# Patient Record
Sex: FEMALE | Race: BLACK OR AFRICAN AMERICAN | NOT HISPANIC OR LATINO | Employment: OTHER | URBAN - METROPOLITAN AREA
[De-identification: names, ages, dates, MRNs, and addresses within clinical notes are randomized per-mention and may not be internally consistent; named-entity substitution may affect disease eponyms.]

---

## 2017-07-11 ENCOUNTER — OFFICE VISIT (OUTPATIENT)
Dept: OBSTETRICS AND GYNECOLOGY | Facility: CLINIC | Age: 67
End: 2017-07-11
Payer: MEDICARE

## 2017-07-11 VITALS
HEIGHT: 66 IN | BODY MASS INDEX: 31.2 KG/M2 | SYSTOLIC BLOOD PRESSURE: 132 MMHG | WEIGHT: 194.13 LBS | DIASTOLIC BLOOD PRESSURE: 72 MMHG

## 2017-07-11 DIAGNOSIS — Z11.3 SCREENING FOR GONORRHEA: ICD-10-CM

## 2017-07-11 DIAGNOSIS — N76.0 BACTERIAL VAGINOSIS: ICD-10-CM

## 2017-07-11 DIAGNOSIS — B96.89 BACTERIAL VAGINOSIS: ICD-10-CM

## 2017-07-11 DIAGNOSIS — A56.2 CHLAMYDIAL INFECTION OF GENITOURINARY TRACT: ICD-10-CM

## 2017-07-11 DIAGNOSIS — Z01.419 ENCOUNTER FOR GYNECOLOGICAL EXAMINATION (GENERAL) (ROUTINE) WITHOUT ABNORMAL FINDINGS: Primary | ICD-10-CM

## 2017-07-11 PROCEDURE — G0101 CA SCREEN;PELVIC/BREAST EXAM: HCPCS | Mod: S$PBB,,, | Performed by: OBSTETRICS & GYNECOLOGY

## 2017-07-11 PROCEDURE — 87591 N.GONORRHOEAE DNA AMP PROB: CPT

## 2017-07-11 PROCEDURE — 87660 TRICHOMONAS VAGIN DIR PROBE: CPT

## 2017-07-11 PROCEDURE — 99999 PR PBB SHADOW E&M-NEW PATIENT-LVL III: CPT | Mod: PBBFAC,,, | Performed by: OBSTETRICS & GYNECOLOGY

## 2017-07-11 PROCEDURE — 99203 OFFICE O/P NEW LOW 30 MIN: CPT | Mod: PBBFAC,PN,59 | Performed by: OBSTETRICS & GYNECOLOGY

## 2017-07-11 PROCEDURE — 87480 CANDIDA DNA DIR PROBE: CPT

## 2017-07-11 PROCEDURE — G0101 CA SCREEN;PELVIC/BREAST EXAM: HCPCS | Mod: PBBFAC,PN | Performed by: OBSTETRICS & GYNECOLOGY

## 2017-07-11 RX ORDER — OXYCODONE HYDROCHLORIDE 5 MG/1
5 CAPSULE ORAL EVERY 4 HOURS PRN
Status: ON HOLD | COMMUNITY
End: 2018-04-25 | Stop reason: HOSPADM

## 2017-07-11 RX ORDER — LORATADINE 10 MG/1
10 TABLET ORAL DAILY
COMMUNITY

## 2017-07-11 RX ORDER — METFORMIN HYDROCHLORIDE 500 MG/1
500 TABLET ORAL
COMMUNITY

## 2017-07-11 RX ORDER — CYCLOBENZAPRINE HCL 10 MG
10 TABLET ORAL 3 TIMES DAILY PRN
COMMUNITY
End: 2018-07-26

## 2017-07-11 RX ORDER — ATENOLOL 25 MG/1
25 TABLET ORAL DAILY
COMMUNITY
End: 2018-01-15 | Stop reason: ALTCHOICE

## 2017-07-11 RX ORDER — CELECOXIB 200 MG/1
200 CAPSULE ORAL 2 TIMES DAILY
COMMUNITY
End: 2018-01-15 | Stop reason: ALTCHOICE

## 2017-07-11 RX ORDER — VALSARTAN AND HYDROCHLOROTHIAZIDE 160; 12.5 MG/1; MG/1
1 TABLET, FILM COATED ORAL DAILY
COMMUNITY
End: 2018-01-02 | Stop reason: DRUGHIGH

## 2017-07-11 RX ORDER — ROSUVASTATIN CALCIUM 10 MG/1
10 TABLET, COATED ORAL NIGHTLY
COMMUNITY

## 2017-07-11 RX ORDER — OMEPRAZOLE 40 MG/1
40 CAPSULE, DELAYED RELEASE ORAL DAILY
COMMUNITY

## 2017-07-11 NOTE — PROGRESS NOTES
Subjective:       Patient ID: Aimee Montoya is a 66 y.o. female.    Chief Complaint:  Well Woman      History of Present Illness  HPI  Annual Exam-Postmenopausal  Patient presents for annual exam. The patient has no complaints today. The patient is sexually active--not using condoms but with usual partner. GYN screening history: last pap: was normal and patient does not recall when last pap was and last mammogram: approximate date 2017 and was normal--still followed by dr yoon. The patient has never been taking hormone replacement therapy. Patient denies post-menopausal vaginal bleeding. The patient wears seatbelts: yes. The patient participates in regular exercise: yes.--cycles 15min/d 3-4 times a week;  Has the patient ever been transfused or tattooed?: no. The patient reports that there is not domestic violence in her life.      Reports intermittent pelvic pains; sharp-shooting    Reports problems with constipation        GYN & OB HistoryNo LMP recorded. Patient is postmenopausal.   Date of Last Pap: No result found    OB History    Para Term  AB Living   1 1       1   SAB TAB Ectopic Multiple Live Births                  # Outcome Date GA Lbr Milo/2nd Weight Sex Delivery Anes PTL Lv   1 Para                   Review of Systems  Review of Systems   Constitutional: Negative for activity change, appetite change, chills, diaphoresis, fatigue, fever and unexpected weight change.   HENT: Negative for mouth sores and tinnitus.    Eyes: Negative for discharge and visual disturbance.   Respiratory: Negative for cough, shortness of breath and wheezing.    Cardiovascular: Negative for chest pain, palpitations and leg swelling.   Gastrointestinal: Negative for abdominal pain, bloating, blood in stool, constipation, diarrhea, nausea and vomiting.   Endocrine: Negative for diabetes, hair loss, hot flashes, hyperthyroidism and hypothyroidism.   Genitourinary: Positive for vaginal discharge. Negative for  decreased libido, dyspareunia, dysuria, flank pain, frequency, genital sores, hematuria, menorrhagia, menstrual problem, pelvic pain, urgency, vaginal bleeding, vaginal pain, dysmenorrhea, urinary incontinence, postcoital bleeding, postmenopausal bleeding and vaginal odor.   Musculoskeletal: Negative for back pain and myalgias.   Skin:  Negative for rash, no acne and hair changes.   Neurological: Negative for seizures, syncope, numbness and headaches.   Hematological: Negative for adenopathy. Does not bruise/bleed easily.   Psychiatric/Behavioral: Negative for depression and sleep disturbance. The patient is not nervous/anxious.    Breast: Negative for breast mass, breast pain, nipple discharge and skin changes          Objective:    Physical Exam:   Constitutional: She appears well-developed.     Eyes: Conjunctivae and EOM are normal. Pupils are equal, round, and reactive to light.    Neck: Normal range of motion. Neck supple.     Pulmonary/Chest: Effort normal. Right breast exhibits no mass, no nipple discharge, no skin change and no tenderness. Left breast exhibits no mass, no nipple discharge, no skin change and no tenderness. Breasts are symmetrical.        Abdominal: Soft.     Genitourinary: Rectum normal and uterus normal. Pelvic exam was performed with patient supine. Cervix is normal. Right adnexum displays no mass and no tenderness. Left adnexum displays no mass and no tenderness. No erythema, bleeding, rectocele, cystocele or unspecified prolapse of vaginal walls in the vagina. Vaginal discharge found. Labial bartholins normal.       Uterus Size: 6 cm   Musculoskeletal: Normal range of motion.       Neurological: She is alert.    Skin: Skin is warm.    Psychiatric: She has a normal mood and affect.          Assessment:        1. Encounter for gynecological examination (general) (routine) without abnormal findings    2. Screening for gonorrhea    3. Bacterial vaginosis    4. Chlamydial infection of  genitourinary tract                Plan:      Continue annual well woman exam.  Continue diet, exercise, weight loss  Safe sex  Gc/ct/affirm today  Increase water and fiber for constipation

## 2017-07-12 LAB
C TRACH DNA SPEC QL NAA+PROBE: NOT DETECTED
CANDIDA RRNA VAG QL PROBE: NEGATIVE
G VAGINALIS RRNA GENITAL QL PROBE: NEGATIVE
N GONORRHOEA DNA SPEC QL NAA+PROBE: NOT DETECTED
T VAGINALIS RRNA GENITAL QL PROBE: NEGATIVE

## 2017-09-06 ENCOUNTER — TELEPHONE (OUTPATIENT)
Dept: OBSTETRICS AND GYNECOLOGY | Facility: CLINIC | Age: 67
End: 2017-09-06

## 2017-09-06 NOTE — TELEPHONE ENCOUNTER
Spoke to the pt. and informed her that her test were negative. junior.[email or web address]    Pt at [phone]//states she is calling to get the results of test she had done//please call//thanks/lalo

## 2017-12-28 ENCOUNTER — TELEPHONE (OUTPATIENT)
Dept: OBSTETRICS AND GYNECOLOGY | Facility: CLINIC | Age: 67
End: 2017-12-28

## 2017-12-28 NOTE — TELEPHONE ENCOUNTER
----- Message from Mariella Collado sent at 12/28/2017  7:23 AM CST -----  Contact: self  Pt stated that she has fibroids and needs to be seen today.  She started bleeding last night.      Pt can be reached at [phone]

## 2017-12-28 NOTE — TELEPHONE ENCOUNTER
----- Message from Bhumi Retana sent at 12/28/2017  1:55 PM CST -----  Contact: self  Patient states went to ER for vaginal bleeding   Pt states advised to follow up in one to two weeks.   Please call pt [phone]

## 2017-12-28 NOTE — TELEPHONE ENCOUNTER
Pt states she started bleeding last night and she's concern because she has fibroids, but the bleeding has stopped since then she would like to be seen by you today.  I informed her that you're out of the office until Tuesday.  She voiced understanding and asked when could you see her?  Please advise

## 2017-12-28 NOTE — TELEPHONE ENCOUNTER
I spoke with pt she states she went to the ED for bleeding today.  I informed her that Dr. Esposito said for her to come into the office next week for an appointment.  Appointment scheduled on 01/02/18 at 8am [place] location.  She verbalized understanding.

## 2018-01-02 ENCOUNTER — OFFICE VISIT (OUTPATIENT)
Dept: OBSTETRICS AND GYNECOLOGY | Facility: CLINIC | Age: 68
End: 2018-01-02
Payer: MEDICARE

## 2018-01-02 VITALS
DIASTOLIC BLOOD PRESSURE: 78 MMHG | WEIGHT: 194 LBS | HEIGHT: 66 IN | SYSTOLIC BLOOD PRESSURE: 136 MMHG | BODY MASS INDEX: 31.18 KG/M2

## 2018-01-02 DIAGNOSIS — N95.0 POST-MENOPAUSAL BLEEDING: Primary | ICD-10-CM

## 2018-01-02 PROCEDURE — 99999 PR PBB SHADOW E&M-EST. PATIENT-LVL II: CPT | Mod: PBBFAC,,, | Performed by: OBSTETRICS & GYNECOLOGY

## 2018-01-02 PROCEDURE — 88305 TISSUE EXAM BY PATHOLOGIST: CPT | Mod: 26,,, | Performed by: PATHOLOGY

## 2018-01-02 PROCEDURE — 99213 OFFICE O/P EST LOW 20 MIN: CPT | Mod: 25,S$PBB,, | Performed by: OBSTETRICS & GYNECOLOGY

## 2018-01-02 PROCEDURE — 99212 OFFICE O/P EST SF 10 MIN: CPT | Mod: PBBFAC,PN,25 | Performed by: OBSTETRICS & GYNECOLOGY

## 2018-01-02 PROCEDURE — 58100 BIOPSY OF UTERUS LINING: CPT | Mod: S$PBB,,, | Performed by: OBSTETRICS & GYNECOLOGY

## 2018-01-02 PROCEDURE — 88305 TISSUE EXAM BY PATHOLOGIST: CPT | Performed by: PATHOLOGY

## 2018-01-02 PROCEDURE — 58100 BIOPSY OF UTERUS LINING: CPT | Mod: PBBFAC,PN | Performed by: OBSTETRICS & GYNECOLOGY

## 2018-01-02 RX ORDER — VALSARTAN AND HYDROCHLOROTHIAZIDE 320; 12.5 MG/1; MG/1
TABLET, FILM COATED ORAL
COMMUNITY
Start: 2017-12-27 | End: 2018-07-26

## 2018-01-02 RX ORDER — METOPROLOL SUCCINATE 25 MG/1
25 TABLET, EXTENDED RELEASE ORAL NIGHTLY
COMMUNITY
Start: 2017-12-02

## 2018-01-02 NOTE — PROGRESS NOTES
Subjective:       Patient ID: Aimee Montoya is a 67 y.o. female.    Chief Complaint:  Vaginal Bleeding      History of Present Illness  HPI  Postmenopausal Bleeding  Patient complains of vaginal bleeding. She has been menopausal for  years. Currently on no HRT . Bleeding described as onto panti liner for 1 day; seen in the er for the same; denies uterine cramping; last intercourse; 1 month ago; no condom use;  Seen in ER for same; had cbc and ua done; gc/ct pending; wet prep negative    Patient recalls similar history in the past but cannot recall the date-- , maybe; last pap  and wnl    2015--ut 7x4.5x 5.3  Menstrual History:  OB History      Para Term  AB Living    1 1       1    SAB TAB Ectopic Multiple Live Births                      Menarche age:   No LMP recorded. Patient is postmenopausal. since age 48       GYN & OB History  No LMP recorded. Patient is postmenopausal.   Date of Last Pap: No result found    OB History    Para Term  AB Living   1 1       1   SAB TAB Ectopic Multiple Live Births                  # Outcome Date GA Lbr Milo/2nd Weight Sex Delivery Anes PTL Lv   1 Para                   Review of Systems  Review of Systems   Constitutional: Negative for activity change, appetite change, chills, diaphoresis, fatigue, fever and unexpected weight change.   HENT: Negative for mouth sores and tinnitus.    Eyes: Negative for discharge and visual disturbance.   Respiratory: Negative for cough, shortness of breath and wheezing.    Cardiovascular: Negative for chest pain, palpitations and leg swelling.   Gastrointestinal: Negative for abdominal pain, bloating, blood in stool, constipation, diarrhea, nausea and vomiting.   Endocrine: Negative for diabetes, hair loss, hot flashes, hyperthyroidism and hypothyroidism.   Genitourinary: Positive for postmenopausal bleeding. Negative for decreased libido, dyspareunia, dysuria, flank pain, frequency, genital sores, hematuria,  menorrhagia, menstrual problem, pelvic pain, urgency, vaginal bleeding, vaginal discharge, vaginal pain, dysmenorrhea, urinary incontinence, postcoital bleeding and vaginal odor.   Musculoskeletal: Negative for back pain and myalgias.   Skin:  Negative for rash, no acne and hair changes.   Neurological: Negative for seizures, syncope, numbness and headaches.   Hematological: Negative for adenopathy. Does not bruise/bleed easily.   Psychiatric/Behavioral: Negative for depression and sleep disturbance. The patient is not nervous/anxious.    Breast: Negative for breast mass, breast pain, nipple discharge and skin changes          Objective:    Physical Exam:   Constitutional: She appears well-developed.     Eyes: Conjunctivae and EOM are normal. Pupils are equal, round, and reactive to light.    Neck: Normal range of motion. Neck supple.     Pulmonary/Chest: Effort normal. Right breast exhibits no mass, no nipple discharge, no skin change, no tenderness and presence. Left breast exhibits no mass, no nipple discharge, no skin change, no tenderness and presence. Breasts are symmetrical.        Abdominal: Soft.     Genitourinary: Vagina normal and uterus normal. Pelvic exam was performed with patient supine.           Musculoskeletal: Normal range of motion.       Neurological: She is alert.    Skin: Skin is warm.    Psychiatric: She has a normal mood and affect.          Assessment:     Encounter Diagnosis   Name Primary?    Post-menopausal bleeding Yes                 Plan:      Reviewed need for emb today  Verbal consent obtained  Pelvic sono ordered  Pt feels she may want hysterectomy as post menopausal bleeding continues to happen intermittently  Per pt report--current on pap

## 2018-01-02 NOTE — PROCEDURES
Procedures   CC: ENDOMETRIAL BIOPSPY    Aimee Montoya is a 67 y.o. female  presents for an endometrial biopsy secondary to   Encounter Diagnosis   Name Primary?    Post-menopausal bleeding Yes     .  UPT was not done.      PRE-ENDOMETRIAL BIOPSY COUNSELING:    The patient was informed of the risk of bleeding, infection, uterine perforation and pain and that the test will rule-out endometrial cancer with accuracy greater than 95%.  She was counseled on the alternatives to endometrial biopsy and agrees to proceed.      TIME OUT PERFORMED.    The cervix was visualized with a speculum.  A single tooth tenaculum was placed on the anterior lip prior to the biopsy.      A sterile endometrial pipelle was passed without difficulty to a depth of 9 cm.    Endometrial tissue was obtained.      The specimen was placed in formalyn and sent to Pathology of histology evaluation.    The patient tolerated the procedure well.      ASSESSMENT AND PLAN  1. Post-menopausal bleeding  US OB/GYN Procedure ([facility])       POST ENDOMETRIAL BIOPSY COUNSELING:  Manage post biopsy cramping with NSAIDs or Tylenol.  Expect spotting or light bleeding for a few days.  Report bleeding heavier than a period, fever > 101.0 F, worsening pain or a foul smelling vaginal discharge.      Counseling lasted approximately 15 minutes and all her questions were answered.      FOLLOW-UP:  Pending biopsy

## 2018-01-04 ENCOUNTER — PROCEDURE VISIT (OUTPATIENT)
Dept: OBSTETRICS AND GYNECOLOGY | Facility: CLINIC | Age: 68
End: 2018-01-04
Payer: MEDICARE

## 2018-01-04 DIAGNOSIS — N95.0 POST-MENOPAUSAL BLEEDING: ICD-10-CM

## 2018-01-04 PROCEDURE — 76856 US EXAM PELVIC COMPLETE: CPT | Mod: PBBFAC,PN | Performed by: OBSTETRICS & GYNECOLOGY

## 2018-01-04 PROCEDURE — 76856 US EXAM PELVIC COMPLETE: CPT | Mod: 26,S$PBB,, | Performed by: OBSTETRICS & GYNECOLOGY

## 2018-01-09 ENCOUNTER — TELEPHONE (OUTPATIENT)
Dept: OBSTETRICS AND GYNECOLOGY | Facility: CLINIC | Age: 68
End: 2018-01-09

## 2018-01-09 NOTE — TELEPHONE ENCOUNTER
----- Message from Carie Garcia sent at 1/9/2018  1:37 PM CST -----  Contact: self [phone]  Would like to consult with nurse regarding lab results.   Please call back at [phone].  Md Nichole

## 2018-01-09 NOTE — TELEPHONE ENCOUNTER
Unable to leave a message because their is no voicemail set=up. Ssmith,lpn    Please keep scheduled appt to review ultrasound results

## 2018-01-15 ENCOUNTER — TELEPHONE (OUTPATIENT)
Dept: OBSTETRICS AND GYNECOLOGY | Facility: CLINIC | Age: 68
End: 2018-01-15

## 2018-01-15 ENCOUNTER — OFFICE VISIT (OUTPATIENT)
Dept: OBSTETRICS AND GYNECOLOGY | Facility: CLINIC | Age: 68
End: 2018-01-15
Payer: MEDICARE

## 2018-01-15 VITALS
WEIGHT: 197.19 LBS | SYSTOLIC BLOOD PRESSURE: 162 MMHG | HEIGHT: 66 IN | BODY MASS INDEX: 31.69 KG/M2 | DIASTOLIC BLOOD PRESSURE: 89 MMHG

## 2018-01-15 DIAGNOSIS — D25.1 INTRAMURAL LEIOMYOMA OF UTERUS: ICD-10-CM

## 2018-01-15 DIAGNOSIS — N95.0 POST-MENOPAUSAL BLEEDING: Primary | ICD-10-CM

## 2018-01-15 PROCEDURE — 99213 OFFICE O/P EST LOW 20 MIN: CPT | Mod: S$PBB,,, | Performed by: OBSTETRICS & GYNECOLOGY

## 2018-01-15 PROCEDURE — 99213 OFFICE O/P EST LOW 20 MIN: CPT | Mod: PBBFAC,PN | Performed by: OBSTETRICS & GYNECOLOGY

## 2018-01-15 PROCEDURE — 99999 PR PBB SHADOW E&M-EST. PATIENT-LVL III: CPT | Mod: PBBFAC,,, | Performed by: OBSTETRICS & GYNECOLOGY

## 2018-01-15 NOTE — PROGRESS NOTES
Subjective:       Patient ID: Aimee Montoya is a 67 y.o. female.    Chief Complaint:  Follow-up      History of Present Illness  HPI  Here for f/u  Reports no longer having an episode of post menopausal bleeding--tired of it happening  emb--scant atrophic tissue, no hyperplasia  Ut --Uterus: Visualized  Endometrium: distorted by presence of fibroids  Uterus long 6.9 cm  Uterus ap 4.3 cm  Uterus tr 5.2 cm  Uterus vol 80.2 cmÂ³  Endometrial thickness, total 4.1 mm  Fibroids: Fibroids identified  Findings: Intramural  D1 42.1 mm  D2 28.6 mm  D3 33.4 mm  Mean 34.7 mm  Vol 21.057 cmÂ³    Right Ovary  =========  Rt ovary: Suboptimal    Left Ovary  ========  Lt ovary: Visualized  Lt ovary D1 2.6 cm  Lt ovary D2 1.7 cm  Lt ovary D3 1.5 cm  Lt ovary mean 1.9 cm  Lt ovary vol 3.4 cmÂ³    Impression  =========  4.2 x 3.9 x 3.3cm intramural fibroid seen and endometrial stripe somewhat obscured by the fibroid  Right ovary suboptimal  Left ovary and adnexa appear normal .                                                        GYN & OB History  No LMP recorded. Patient is postmenopausal.   Date of Last Pap: No result found    OB History    Para Term  AB Living   1 1       1   SAB TAB Ectopic Multiple Live Births                  # Outcome Date GA Lbr Milo/2nd Weight Sex Delivery Anes PTL Lv   1 Para                   Review of Systems  Review of Systems   Constitutional: Negative for activity change, appetite change, chills, diaphoresis, fatigue, fever and unexpected weight change.   HENT: Negative for mouth sores and tinnitus.    Eyes: Negative for discharge and visual disturbance.   Respiratory: Negative for cough, shortness of breath and wheezing.    Cardiovascular: Negative for chest pain, palpitations and leg swelling.   Gastrointestinal: Negative for abdominal pain, bloating, blood in stool, constipation, diarrhea, nausea and vomiting.   Endocrine: Negative for diabetes, hair loss, hot flashes, hyperthyroidism  and hypothyroidism.   Genitourinary: Positive for postmenopausal bleeding. Negative for decreased libido, dyspareunia, dysuria, flank pain, frequency, genital sores, hematuria, menorrhagia, menstrual problem, pelvic pain, urgency, vaginal bleeding, vaginal discharge, vaginal pain, dysmenorrhea, urinary incontinence, postcoital bleeding and vaginal odor.   Musculoskeletal: Negative for back pain and myalgias.   Skin:  Negative for rash, no acne and hair changes.   Neurological: Negative for seizures, syncope, numbness and headaches.   Hematological: Negative for adenopathy. Does not bruise/bleed easily.   Psychiatric/Behavioral: Negative for depression and sleep disturbance. The patient is not nervous/anxious.    Breast: Negative for breast mass, breast pain, nipple discharge and skin changes          Objective:    Physical Exam:   Constitutional: She appears well-developed.     Eyes: Conjunctivae and EOM are normal. Pupils are equal, round, and reactive to light.    Neck: Normal range of motion. Neck supple.     Pulmonary/Chest: Effort normal.        Abdominal: Soft.             Musculoskeletal: Normal range of motion.       Neurological: She is alert.    Skin: Skin is warm.    Psychiatric: She has a normal mood and affect.          Assessment:     Encounter Diagnoses   Name Primary?    Post-menopausal bleeding Yes    Intramural leiomyoma of uterus              Plan:      Reviewed options  Reassurance given  In light of normal emb and benign fibroids  Options reviewed--continued observation, hysteroscopy with d&c possible myosure, hysterectomy  Patient wishes to proceed with hysterectomy; case request submitted  Pt advised to maintain good glycemic control prior to surgery

## 2018-01-29 ENCOUNTER — TELEPHONE (OUTPATIENT)
Dept: OBSTETRICS AND GYNECOLOGY | Facility: CLINIC | Age: 68
End: 2018-01-29

## 2018-01-29 NOTE — TELEPHONE ENCOUNTER
States she's coming in for her pre op appts and states that she needs to resched due to her daughter having jury duty. Pt surgery is scheduled for 02/07/18 and can be reached at [phone]//thanks/dbw

## 2018-02-28 ENCOUNTER — OFFICE VISIT (OUTPATIENT)
Dept: OBSTETRICS AND GYNECOLOGY | Facility: CLINIC | Age: 68
End: 2018-02-28
Payer: MEDICARE

## 2018-02-28 ENCOUNTER — HOSPITAL ENCOUNTER (OUTPATIENT)
Dept: PREADMISSION TESTING | Facility: HOSPITAL | Age: 68
Discharge: HOME | End: 2018-02-28
Attending: OBSTETRICS & GYNECOLOGY
Payer: MEDICARE

## 2018-02-28 ENCOUNTER — CLINICAL SUPPORT (OUTPATIENT)
Dept: CARDIOLOGY | Facility: CLINIC | Age: 68
End: 2018-02-28
Payer: MEDICARE

## 2018-02-28 VITALS
SYSTOLIC BLOOD PRESSURE: 140 MMHG | HEIGHT: 66 IN | WEIGHT: 180 LBS | WEIGHT: 194.88 LBS | DIASTOLIC BLOOD PRESSURE: 76 MMHG | BODY MASS INDEX: 28.25 KG/M2 | BODY MASS INDEX: 31.32 KG/M2 | HEIGHT: 67 IN

## 2018-02-28 DIAGNOSIS — Z01.818 PRE-OP TESTING: Primary | ICD-10-CM

## 2018-02-28 DIAGNOSIS — Z01.818 PRE-OP TESTING: ICD-10-CM

## 2018-02-28 DIAGNOSIS — N95.0 POST-MENOPAUSAL BLEEDING: Primary | ICD-10-CM

## 2018-02-28 PROCEDURE — 99999 PR PBB SHADOW E&M-EST. PATIENT-LVL II: CPT | Mod: PBBFAC,,, | Performed by: OBSTETRICS & GYNECOLOGY

## 2018-02-28 PROCEDURE — 99212 OFFICE O/P EST SF 10 MIN: CPT | Mod: PBBFAC | Performed by: OBSTETRICS & GYNECOLOGY

## 2018-02-28 PROCEDURE — 93010 ELECTROCARDIOGRAM REPORT: CPT | Mod: S$PBB,,, | Performed by: INTERNAL MEDICINE

## 2018-02-28 PROCEDURE — 99499 UNLISTED E&M SERVICE: CPT | Mod: S$PBB,,, | Performed by: OBSTETRICS & GYNECOLOGY

## 2018-02-28 PROCEDURE — 93005 ELECTROCARDIOGRAM TRACING: CPT | Mod: PBBFAC | Performed by: INTERNAL MEDICINE

## 2018-02-28 NOTE — PROGRESS NOTES
History & Physical    SUBJECTIVE:     History of Present Illness:  Patient is a 67 y.o. female presents with intermittent post menopausal bleeding despite negtive emb; . Onset of symptoms was several years ago; has had 2 negative emb; history of fibroid uterus; denies use of hrt; Uterus: Visualized  Endometrium: distorted by presence of fibroids  Uterus long 6.9 cm  Uterus ap 4.3 cm  Uterus tr 5.2 cm  Uterus vol 80.2 cmÂ³  Endometrial thickness, total 4.1 mm  Fibroids: Fibroids identified  Findings: Intramural  D1 42.1 mm  D2 28.6 mm  D3 33.4 mm  Mean 34.7 mm  Vol 21.057 cmÂ³    Right Ovary  =========  Rt ovary: Suboptimal    Left Ovary  ========  Lt ovary: Visualized  Lt ovary D1 2.6 cm  Lt ovary D2 1.7 cm  Lt ovary D3 1.5 cm  Lt ovary mean 1.9 cm  Lt ovary vol 3.4 cmÂ³    Impression  =========  4.2 x 3.9 x 3.3cm intramural fibroid seen and endometrial stripe somewhat obscured by the fibroid  Right ovary suboptimal  Left ovary and adnexa appear normal .                                                          Chief Complaint   Patient presents with    Pre-op Exam       Review of patient's allergies indicates:   Allergen Reactions    Dye Rash    Pcn [penicillins] Rash       Current Outpatient Prescriptions   Medication Sig Dispense Refill    metformin (GLUCOPHAGE) 500 MG tablet Take 500 mg by mouth daily with breakfast.       metoprolol succinate (TOPROL-XL) 25 MG 24 hr tablet Take 25 mg by mouth every evening.       omeprazole (PRILOSEC) 40 MG capsule Take 40 mg by mouth once daily.      oxycodone (OXY-IR) 5 mg Cap Take 5 mg by mouth every 4 (four) hours as needed for Pain.      PATADAY 0.2 % Drop       rosuvastatin (CRESTOR) 10 MG tablet Take 10 mg by mouth every evening.       valsartan-hydrochlorothiazide (DIOVAN-HCT) 320-12.5 mg per tablet       cyclobenzaprine (FLEXERIL) 10 MG tablet Take 10 mg by mouth 3 (three) times daily as needed for Muscle spasms.      loratadine (CLARITIN) 10 mg tablet Take  "10 mg by mouth once daily.       No current facility-administered medications for this visit.        Past Medical History:   Diagnosis Date    Diabetes mellitus     High cholesterol     Hypertension     Seasonal allergies      Past Surgical History:   Procedure Laterality Date    BREAST LUMPECTOMY Left      SECTION      x 1    CHOLECYSTECTOMY       Family History   Problem Relation Age of Onset    Cancer Father     Heart disease Mother      Social History   Substance Use Topics    Smoking status: Never Smoker    Smokeless tobacco: Never Used    Alcohol use No        Review of Systems:  Review of Systems   Constitutional: Negative.    HENT: Negative.    Eyes: Negative.    Cardiovascular: Negative.    Gastrointestinal: Negative.    Endocrine: Negative.    Genitourinary: Positive for vaginal bleeding.   Musculoskeletal: Negative.    Skin: Negative.    Allergic/Immunologic: Negative.    Neurological: Negative.    Hematological: Negative.    Psychiatric/Behavioral: Negative.  Negative for agitation.       OBJECTIVE:     Vital Signs (Most Recent)  BP: (!) 140/76 (18 1328)  5' 6" (1.676 m)  88.4 kg (194 lb 14.2 oz)     Physical Exam:  Physical Exam   Constitutional: She is oriented to person, place, and time. She appears well-developed.   HENT:   Head: Normocephalic.   Eyes: Conjunctivae and EOM are normal. Pupils are equal, round, and reactive to light.   Neck: Normal range of motion.   Cardiovascular: Normal rate.    Pulmonary/Chest: Effort normal and breath sounds normal.   Abdominal: Soft.   Musculoskeletal: Normal range of motion.   Neurological: She is alert and oriented to person, place, and time.   Skin: Skin is warm and dry.   Psychiatric: She has a normal mood and affect. Her behavior is normal. Judgment and thought content normal.   Vitals reviewed.      Laboratory  cbc,bhcg, cmp, ekg pending    Diagnostic Results:  US: Reviewed  see findings above    ASSESSMENT/PLAN:     Encounter " Diagnosis   Name Primary?    Post-menopausal bleeding Yes         PLAN:Plan     Reviewed robotic assisted laparoscopic hysterectomy with bilateral salpingoophorectomy procedure in detail.  Reviewed risks including but not limited to infection, bleeding, damage to bowel/bladder, cva;htn, death.  Pt aware hysterectomy will render her unable to have children.     All questions answered to the best of my ability.  Alternatives reviewed --continued observation; hysteroscopy with myosure/d&c.  Consents signed and witnessed.  Patient aware of surgery date and time.  Stressed compliance with perfect diabetes control post op to help with post op healing.  Post op appt made.

## 2018-02-28 NOTE — DISCHARGE INSTRUCTIONS
To confirm, Your doctor has instructed you that surgery is scheduled for 3/7/18 at  07:00 am.       Please report to [facility], [address], 1st floor, main lobby by 05:30 am  Pre admit office will call afternoon prior to surgery with final arrival time    INSTRUCTIONS IMPORTANT!!!   Do not eat, drink, or smoke after 12 midnight. May have water and clear liquid juice until 3 hrs prior to surgery.   OK to brush teeth, no gum, candy or mints!    ¨ Take only these medicines with a small swallow of water-morning of surgery.  Prilosec, Pataday eye drops    Hold Diovan morning of Surgery    Hold Metformin 3/6 and morning of Surgery    Pre operative instructions:  Please review the Pre-Operative Instruction booklet that you were given.        Bathing Instructions--See page 6 in the Pre-operative booklet.      Prevention of surgical site infections:     -Keep incisions clean and dry.   -Do not soak/submerge incisions in water until completely healed.   -Do not apply lotions, powders, creams, or deodorants to site.   -Always make sure hands are cleaned with antibacterial soap/ alcohol-based                 prior to touching the surgical site.  (This includes doctors,                 nurses, staff, and yourself.)    Signs and symptoms:   -Redness and pain around the area where you had surgery   -Drainage of cloudy fluid from your surgical wound   -Fever over 100.4       I have read or had read and explained to me, and understand the above information.  Additional comments or instructions:  Received a copy of Pre-operative instructions booklet, FAQ surgical site infection sheet, and packets of hibiclens (if indicated).

## 2018-03-05 ENCOUNTER — TELEPHONE (OUTPATIENT)
Dept: OBSTETRICS AND GYNECOLOGY | Facility: CLINIC | Age: 68
End: 2018-03-05

## 2018-03-05 DIAGNOSIS — Z01.818 PREOP EXAMINATION: ICD-10-CM

## 2018-03-05 DIAGNOSIS — R94.31 ABNORMAL EKG: Primary | ICD-10-CM

## 2018-03-05 NOTE — TELEPHONE ENCOUNTER
Faxed pt.'s abn cardiology report to Dr. Leo and informed the pt. That she needs a clearance before she can have her sx on 3/7/18.  Pt. Acknowledged understanding. susie Bone

## 2018-03-13 ENCOUNTER — TELEPHONE (OUTPATIENT)
Dept: OBSTETRICS AND GYNECOLOGY | Facility: CLINIC | Age: 68
End: 2018-03-13

## 2018-03-13 DIAGNOSIS — N95.0 POST-MENOPAUSAL BLEEDING: Primary | ICD-10-CM

## 2018-03-13 DIAGNOSIS — D25.1 INTRAMURAL LEIOMYOMA OF UTERUS: ICD-10-CM

## 2018-04-24 ENCOUNTER — ANESTHESIA EVENT (OUTPATIENT)
Dept: SURGERY | Facility: HOSPITAL | Age: 68
End: 2018-04-24
Payer: MEDICARE

## 2018-04-24 ENCOUNTER — LAB VISIT (OUTPATIENT)
Dept: LAB | Facility: HOSPITAL | Age: 68
End: 2018-04-24
Attending: OBSTETRICS & GYNECOLOGY
Payer: MEDICARE

## 2018-04-24 ENCOUNTER — TELEPHONE (OUTPATIENT)
Dept: OBSTETRICS AND GYNECOLOGY | Facility: CLINIC | Age: 68
End: 2018-04-24

## 2018-04-24 DIAGNOSIS — N95.0 POST-MENOPAUSAL BLEEDING: ICD-10-CM

## 2018-04-24 DIAGNOSIS — N95.0 POST-MENOPAUSAL BLEEDING: Primary | ICD-10-CM

## 2018-04-24 LAB
ALBUMIN SERPL BCP-MCNC: 4.1 G/DL
ALP SERPL-CCNC: 44 U/L
ALT SERPL W/O P-5'-P-CCNC: 24 U/L
ANION GAP SERPL CALC-SCNC: 11 MMOL/L
AST SERPL-CCNC: 31 U/L
BASOPHILS # BLD AUTO: 0.01 K/UL
BASOPHILS NFR BLD: 0.2 %
BILIRUB SERPL-MCNC: 0.8 MG/DL
BUN SERPL-MCNC: 13 MG/DL
CALCIUM SERPL-MCNC: 10.2 MG/DL
CHLORIDE SERPL-SCNC: 104 MMOL/L
CO2 SERPL-SCNC: 27 MMOL/L
CREAT SERPL-MCNC: 0.8 MG/DL
DIFFERENTIAL METHOD: ABNORMAL
EOSINOPHIL # BLD AUTO: 0.1 K/UL
EOSINOPHIL NFR BLD: 1.2 %
ERYTHROCYTE [DISTWIDTH] IN BLOOD BY AUTOMATED COUNT: 13.8 %
EST. GFR  (AFRICAN AMERICAN): >60 ML/MIN/1.73 M^2
EST. GFR  (NON AFRICAN AMERICAN): >60 ML/MIN/1.73 M^2
GLUCOSE SERPL-MCNC: 97 MG/DL
HCT VFR BLD AUTO: 40.4 %
HGB BLD-MCNC: 13.1 G/DL
IMM GRANULOCYTES # BLD AUTO: 0 K/UL
IMM GRANULOCYTES NFR BLD AUTO: 0 %
LYMPHOCYTES # BLD AUTO: 2.1 K/UL
LYMPHOCYTES NFR BLD: 52.2 %
MCH RBC QN AUTO: 29.9 PG
MCHC RBC AUTO-ENTMCNC: 32.4 G/DL
MCV RBC AUTO: 92 FL
MONOCYTES # BLD AUTO: 0.4 K/UL
MONOCYTES NFR BLD: 10 %
NEUTROPHILS # BLD AUTO: 1.5 K/UL
NEUTROPHILS NFR BLD: 36.4 %
NRBC BLD-RTO: 0 /100 WBC
PLATELET # BLD AUTO: 166 K/UL
PMV BLD AUTO: 10.9 FL
POTASSIUM SERPL-SCNC: 3.5 MMOL/L
PROT SERPL-MCNC: 8.2 G/DL
RBC # BLD AUTO: 4.38 M/UL
SODIUM SERPL-SCNC: 142 MMOL/L
WBC # BLD AUTO: 4.08 K/UL

## 2018-04-24 PROCEDURE — 36415 COLL VENOUS BLD VENIPUNCTURE: CPT | Mod: PO

## 2018-04-24 PROCEDURE — 85025 COMPLETE CBC W/AUTO DIFF WBC: CPT

## 2018-04-24 PROCEDURE — 80053 COMPREHEN METABOLIC PANEL: CPT

## 2018-04-24 NOTE — TELEPHONE ENCOUNTER
----- Message from Funmi Valdes sent at 4/24/2018  8:59 AM CDT -----  Contact: pt  She's calling in regards to surgery 4/25 she stated she has a few questions pls call pt back at [phone] (home)

## 2018-04-24 NOTE — PRE ADMISSION SCREENING
Pre op instructions reviewed with patient per phone:    To confirm, Your surgeon has instructed you:  Surgery is scheduled 04/25/18at 0800.      Please report to [facility] 1st floor main lobby by 0630.   Pre admit office to call later today only if arrival time changes.      INSTRUCTIONS IMPORTANT!!!  ¨ Do not eat, drink, or smoke after 12 midnight-including water. OK to brush teeth, no gum, candy or mints!    ¨ Take only these medicines with a small swallow of water-morning of surgery.  Prilosec        ____  Do not wear makeup, including mascara.  ____  No powder, lotions or creams to surgical area.  ____  Please remove all jewelry, including piercings and leave at home.  ____  No money or valuables needed. Please leave at home.  ____  Please bring identification and insurance information to hospital.  ____  If going home the same day, arrange for a ride home. You will not be able to   drive if Anesthesia was used.  ____  Children, under 12 years old, must remain in the waiting room with an adult.  They are not allowed in patient areas.  ____  Wear loose fitting clothing. Allow for dressings, bandages.  ____  Stop Aspirin, Ibuprofen, Motrin and Aleve at least 5-7 days before surgery, unless otherwise instructed by your doctor, or the nurse.   You MAY use Tylenol/acetaminophen until day of surgery.  ____  If you take diabetic medication, do not take am of surgery unless instructed by   Doctor.  ____ Stop taking any Fish Oil supplement or any Vitamins that contain Vitamin E at least 5 days prior to surgery.          Bathing Instructions-- The night before surgery and the morning prior to coming to the hospital:   -Do not shave the surgical area.   -Shower and wash your hair and body as usual with anti-bacterial  soap and shampoo.   -Rinse your hair and body completely.   -Use one packet of hibiclens to wash the surgical site (using your hand) gently for 5 minutes.  Do not scrub you skin too  hard.   -Do not use hibiclens on your head, face, or genitals.   -Do not wash with anti-bacterial soap after you use the hibiclens.   -Rinse your body thoroughly.   -Dry with clean, soft towel.  Do not use lotion, cream, deodorant, or powders on   the surgical site.    Use antibacterial soap in place of hibiclens if your surgery is on the head, face or genitals.         Surgical Site Infection    Prevention of surgical site infections:     -Keep incisions clean and dry.   -Do not soak/submerge incisions in water until completely healed.   -Do not apply lotions, powders, creams, or deodorants to site.   -Always make sure hands are cleaned with antibacterial soap/ alcohol-based   prior to touching the surgical site.  (This includes doctors, nurses, staff, and yourself.)    Signs and symptoms:   -Redness and pain around the area where you had surgery   -Drainage of cloudy fluid from your surgical wound   -Fever over 100.4  I have read or had read and explained to me, and understand the above information.

## 2018-04-24 NOTE — TELEPHONE ENCOUNTER
Spoke to pt she was calling to confirm her time for surgery she voiced understanding per [place] to be there for 530a

## 2018-04-25 ENCOUNTER — ANESTHESIA (OUTPATIENT)
Dept: SURGERY | Facility: HOSPITAL | Age: 68
End: 2018-04-25
Payer: MEDICARE

## 2018-04-25 ENCOUNTER — HOSPITAL ENCOUNTER (OUTPATIENT)
Facility: HOSPITAL | Age: 68
Discharge: HOME | End: 2018-04-25
Attending: OBSTETRICS & GYNECOLOGY | Admitting: OBSTETRICS & GYNECOLOGY
Payer: MEDICARE

## 2018-04-25 ENCOUNTER — SURGERY (OUTPATIENT)
Age: 68
End: 2018-04-25

## 2018-04-25 ENCOUNTER — TELEPHONE (OUTPATIENT)
Dept: OBSTETRICS AND GYNECOLOGY | Facility: CLINIC | Age: 68
End: 2018-04-25

## 2018-04-25 DIAGNOSIS — N95.0 POST-MENOPAUSAL BLEEDING: ICD-10-CM

## 2018-04-25 DIAGNOSIS — Z90.710 S/P LAPAROSCOPIC HYSTERECTOMY: Primary | ICD-10-CM

## 2018-04-25 LAB
POCT GLUCOSE: 125 MG/DL (ref 70–110)
POCT GLUCOSE: 160 MG/DL (ref 70–110)

## 2018-04-25 PROCEDURE — 63600175 PHARM REV CODE 636 W HCPCS: Performed by: OBSTETRICS & GYNECOLOGY

## 2018-04-25 PROCEDURE — 71000039 HC RECOVERY, EACH ADD'L HOUR: Performed by: OBSTETRICS & GYNECOLOGY

## 2018-04-25 PROCEDURE — 63600175 PHARM REV CODE 636 W HCPCS: Performed by: NURSE ANESTHETIST, CERTIFIED REGISTERED

## 2018-04-25 PROCEDURE — 36000712 HC OR TIME LEV V 1ST 15 MIN: Performed by: OBSTETRICS & GYNECOLOGY

## 2018-04-25 PROCEDURE — 37000008 HC ANESTHESIA 1ST 15 MINUTES: Performed by: OBSTETRICS & GYNECOLOGY

## 2018-04-25 PROCEDURE — 36000713 HC OR TIME LEV V EA ADD 15 MIN: Performed by: OBSTETRICS & GYNECOLOGY

## 2018-04-25 PROCEDURE — 27201423 OPTIME MED/SURG SUP & DEVICES STERILE SUPPLY: Performed by: OBSTETRICS & GYNECOLOGY

## 2018-04-25 PROCEDURE — 58660 LAPAROSCOPY LYSIS: CPT | Mod: ,,, | Performed by: SURGERY

## 2018-04-25 PROCEDURE — S0077 INJECTION, CLINDAMYCIN PHOSP: HCPCS | Performed by: OBSTETRICS & GYNECOLOGY

## 2018-04-25 PROCEDURE — 88307 TISSUE EXAM BY PATHOLOGIST: CPT | Mod: 26,,, | Performed by: PATHOLOGY

## 2018-04-25 PROCEDURE — 58571 TLH W/T/O 250 G OR LESS: CPT | Mod: ,,, | Performed by: OBSTETRICS & GYNECOLOGY

## 2018-04-25 PROCEDURE — 37000009 HC ANESTHESIA EA ADD 15 MINS: Performed by: OBSTETRICS & GYNECOLOGY

## 2018-04-25 PROCEDURE — 71000016 HC POSTOP RECOV ADDL HR: Performed by: OBSTETRICS & GYNECOLOGY

## 2018-04-25 PROCEDURE — 63600175 PHARM REV CODE 636 W HCPCS: Performed by: ANESTHESIOLOGY

## 2018-04-25 PROCEDURE — 71000033 HC RECOVERY, INTIAL HOUR: Performed by: OBSTETRICS & GYNECOLOGY

## 2018-04-25 PROCEDURE — 71000015 HC POSTOP RECOV 1ST HR: Performed by: OBSTETRICS & GYNECOLOGY

## 2018-04-25 PROCEDURE — 25000003 PHARM REV CODE 250: Performed by: OBSTETRICS & GYNECOLOGY

## 2018-04-25 PROCEDURE — C2628 CATHETER, OCCLUSION: HCPCS | Performed by: OBSTETRICS & GYNECOLOGY

## 2018-04-25 PROCEDURE — 25000003 PHARM REV CODE 250: Performed by: NURSE ANESTHETIST, CERTIFIED REGISTERED

## 2018-04-25 PROCEDURE — 88307 TISSUE EXAM BY PATHOLOGIST: CPT | Performed by: PATHOLOGY

## 2018-04-25 RX ORDER — CLINDAMYCIN PHOSPHATE 900 MG/50ML
900 INJECTION, SOLUTION INTRAVENOUS
Status: COMPLETED | OUTPATIENT
Start: 2018-04-25 | End: 2018-04-25

## 2018-04-25 RX ORDER — DIPHENHYDRAMINE HYDROCHLORIDE 50 MG/ML
25 INJECTION INTRAMUSCULAR; INTRAVENOUS EVERY 4 HOURS PRN
Status: DISCONTINUED | OUTPATIENT
Start: 2018-04-25 | End: 2018-04-25 | Stop reason: HOSPADM

## 2018-04-25 RX ORDER — HYDROCODONE BITARTRATE AND ACETAMINOPHEN 5; 325 MG/1; MG/1
1 TABLET ORAL EVERY 4 HOURS PRN
Qty: 20 TABLET | Refills: 0 | Status: SHIPPED | OUTPATIENT
Start: 2018-04-25 | End: 2018-05-24

## 2018-04-25 RX ORDER — DIPHENHYDRAMINE HYDROCHLORIDE 50 MG/ML
25 INJECTION INTRAMUSCULAR; INTRAVENOUS EVERY 6 HOURS PRN
Status: DISCONTINUED | OUTPATIENT
Start: 2018-04-25 | End: 2018-04-25 | Stop reason: SDUPTHER

## 2018-04-25 RX ORDER — HYDROCODONE BITARTRATE AND ACETAMINOPHEN 5; 325 MG/1; MG/1
1 TABLET ORAL
Status: DISCONTINUED | OUTPATIENT
Start: 2018-04-25 | End: 2018-04-25 | Stop reason: HOSPADM

## 2018-04-25 RX ORDER — NEOSTIGMINE METHYLSULFATE 1 MG/ML
INJECTION, SOLUTION INTRAVENOUS
Status: DISCONTINUED | OUTPATIENT
Start: 2018-04-25 | End: 2018-04-25

## 2018-04-25 RX ORDER — SCOLOPAMINE TRANSDERMAL SYSTEM 1 MG/1
PATCH, EXTENDED RELEASE TRANSDERMAL
Status: COMPLETED
Start: 2018-04-25 | End: 2018-04-25

## 2018-04-25 RX ORDER — ONDANSETRON 2 MG/ML
4 INJECTION INTRAMUSCULAR; INTRAVENOUS DAILY PRN
Status: DISCONTINUED | OUTPATIENT
Start: 2018-04-25 | End: 2018-04-25 | Stop reason: HOSPADM

## 2018-04-25 RX ORDER — DIPHENHYDRAMINE HCL 25 MG
25 CAPSULE ORAL EVERY 4 HOURS PRN
Status: DISCONTINUED | OUTPATIENT
Start: 2018-04-25 | End: 2018-04-25 | Stop reason: HOSPADM

## 2018-04-25 RX ORDER — KETOROLAC TROMETHAMINE 30 MG/ML
30 INJECTION, SOLUTION INTRAMUSCULAR; INTRAVENOUS ONCE
Status: COMPLETED | OUTPATIENT
Start: 2018-04-25 | End: 2018-04-25

## 2018-04-25 RX ORDER — DEXAMETHASONE SODIUM PHOSPHATE 4 MG/ML
INJECTION, SOLUTION INTRA-ARTICULAR; INTRALESIONAL; INTRAMUSCULAR; INTRAVENOUS; SOFT TISSUE
Status: DISCONTINUED | OUTPATIENT
Start: 2018-04-25 | End: 2018-04-25

## 2018-04-25 RX ORDER — GLYCOPYRROLATE 0.2 MG/ML
INJECTION INTRAMUSCULAR; INTRAVENOUS
Status: DISCONTINUED | OUTPATIENT
Start: 2018-04-25 | End: 2018-04-25

## 2018-04-25 RX ORDER — LIDOCAINE HYDROCHLORIDE 10 MG/ML
INJECTION INFILTRATION; PERINEURAL
Status: DISCONTINUED | OUTPATIENT
Start: 2018-04-25 | End: 2018-04-25

## 2018-04-25 RX ORDER — ACETAMINOPHEN 10 MG/ML
INJECTION, SOLUTION INTRAVENOUS
Status: DISCONTINUED | OUTPATIENT
Start: 2018-04-25 | End: 2018-04-25

## 2018-04-25 RX ORDER — ONDANSETRON 8 MG/1
8 TABLET, ORALLY DISINTEGRATING ORAL EVERY 8 HOURS PRN
Status: DISCONTINUED | OUTPATIENT
Start: 2018-04-25 | End: 2018-04-25 | Stop reason: HOSPADM

## 2018-04-25 RX ORDER — ONDANSETRON 2 MG/ML
INJECTION INTRAMUSCULAR; INTRAVENOUS
Status: DISCONTINUED | OUTPATIENT
Start: 2018-04-25 | End: 2018-04-25

## 2018-04-25 RX ORDER — IBUPROFEN 600 MG/1
600 TABLET ORAL EVERY 6 HOURS
Status: DISCONTINUED | OUTPATIENT
Start: 2018-04-26 | End: 2018-04-25 | Stop reason: HOSPADM

## 2018-04-25 RX ORDER — HYDROCODONE BITARTRATE AND ACETAMINOPHEN 7.5; 325 MG/1; MG/1
1 TABLET ORAL EVERY 4 HOURS PRN
Status: DISCONTINUED | OUTPATIENT
Start: 2018-04-25 | End: 2018-04-25 | Stop reason: HOSPADM

## 2018-04-25 RX ORDER — BISACODYL 10 MG
10 SUPPOSITORY, RECTAL RECTAL DAILY PRN
Status: DISCONTINUED | OUTPATIENT
Start: 2018-04-25 | End: 2018-04-25 | Stop reason: HOSPADM

## 2018-04-25 RX ORDER — FENTANYL CITRATE 50 UG/ML
25 INJECTION, SOLUTION INTRAMUSCULAR; INTRAVENOUS EVERY 5 MIN PRN
Status: COMPLETED | OUTPATIENT
Start: 2018-04-25 | End: 2018-04-25

## 2018-04-25 RX ORDER — SCOLOPAMINE TRANSDERMAL SYSTEM 1 MG/1
PATCH, EXTENDED RELEASE TRANSDERMAL
Status: DISCONTINUED | OUTPATIENT
Start: 2018-04-25 | End: 2018-04-25

## 2018-04-25 RX ORDER — HYDROCODONE BITARTRATE AND ACETAMINOPHEN 5; 325 MG/1; MG/1
1 TABLET ORAL EVERY 4 HOURS PRN
Status: DISCONTINUED | OUTPATIENT
Start: 2018-04-25 | End: 2018-04-25 | Stop reason: HOSPADM

## 2018-04-25 RX ORDER — MIDAZOLAM HYDROCHLORIDE 1 MG/ML
INJECTION, SOLUTION INTRAMUSCULAR; INTRAVENOUS
Status: DISCONTINUED | OUTPATIENT
Start: 2018-04-25 | End: 2018-04-25

## 2018-04-25 RX ORDER — MEPERIDINE HYDROCHLORIDE 50 MG/ML
12.5 INJECTION INTRAMUSCULAR; INTRAVENOUS; SUBCUTANEOUS ONCE AS NEEDED
Status: COMPLETED | OUTPATIENT
Start: 2018-04-25 | End: 2018-04-25

## 2018-04-25 RX ORDER — ROCURONIUM BROMIDE 10 MG/ML
INJECTION, SOLUTION INTRAVENOUS
Status: DISCONTINUED | OUTPATIENT
Start: 2018-04-25 | End: 2018-04-25

## 2018-04-25 RX ORDER — IBUPROFEN 800 MG/1
800 TABLET ORAL EVERY 8 HOURS PRN
Qty: 30 TABLET | Refills: 0 | Status: SHIPPED | OUTPATIENT
Start: 2018-04-25 | End: 2018-05-05

## 2018-04-25 RX ORDER — EPHEDRINE SULFATE 50 MG/ML
INJECTION, SOLUTION INTRAVENOUS
Status: DISCONTINUED | OUTPATIENT
Start: 2018-04-25 | End: 2018-04-25

## 2018-04-25 RX ORDER — FENTANYL CITRATE 50 UG/ML
INJECTION, SOLUTION INTRAMUSCULAR; INTRAVENOUS
Status: DISCONTINUED | OUTPATIENT
Start: 2018-04-25 | End: 2018-04-25

## 2018-04-25 RX ORDER — SODIUM CHLORIDE, SODIUM LACTATE, POTASSIUM CHLORIDE, CALCIUM CHLORIDE 600; 310; 30; 20 MG/100ML; MG/100ML; MG/100ML; MG/100ML
INJECTION, SOLUTION INTRAVENOUS CONTINUOUS PRN
Status: DISCONTINUED | OUTPATIENT
Start: 2018-04-25 | End: 2018-04-25

## 2018-04-25 RX ORDER — LIDOCAINE HYDROCHLORIDE 10 MG/ML
1 INJECTION, SOLUTION EPIDURAL; INFILTRATION; INTRACAUDAL; PERINEURAL ONCE
Status: DISCONTINUED | OUTPATIENT
Start: 2018-04-25 | End: 2018-04-25 | Stop reason: HOSPADM

## 2018-04-25 RX ORDER — PROPOFOL 10 MG/ML
VIAL (ML) INTRAVENOUS
Status: DISCONTINUED | OUTPATIENT
Start: 2018-04-25 | End: 2018-04-25

## 2018-04-25 RX ADMIN — LIDOCAINE HYDROCHLORIDE 100 MG: 10 INJECTION, SOLUTION INFILTRATION; PERINEURAL at 07:04

## 2018-04-25 RX ADMIN — ROBINUL 0.6 MG: 0.2 INJECTION INTRAMUSCULAR; INTRAVENOUS at 09:04

## 2018-04-25 RX ADMIN — FENTANYL CITRATE 25 MCG: 50 INJECTION INTRAMUSCULAR; INTRAVENOUS at 10:04

## 2018-04-25 RX ADMIN — FENTANYL CITRATE 25 MCG: 50 INJECTION INTRAMUSCULAR; INTRAVENOUS at 09:04

## 2018-04-25 RX ADMIN — MEPERIDINE HYDROCHLORIDE 12.5 MG: 50 INJECTION INTRAMUSCULAR; INTRAVENOUS; SUBCUTANEOUS at 09:04

## 2018-04-25 RX ADMIN — NEOSTIGMINE METHYLSULFATE 5 MG: 1 INJECTION INTRAVENOUS at 09:04

## 2018-04-25 RX ADMIN — MIDAZOLAM 2 MG: 1 INJECTION INTRAMUSCULAR; INTRAVENOUS at 06:04

## 2018-04-25 RX ADMIN — SODIUM CHLORIDE, SODIUM LACTATE, POTASSIUM CHLORIDE, AND CALCIUM CHLORIDE: 600; 310; 30; 20 INJECTION, SOLUTION INTRAVENOUS at 06:04

## 2018-04-25 RX ADMIN — FENTANYL CITRATE 100 MCG: 50 INJECTION, SOLUTION INTRAMUSCULAR; INTRAVENOUS at 07:04

## 2018-04-25 RX ADMIN — ONDANSETRON 4 MG: 2 INJECTION, SOLUTION INTRAMUSCULAR; INTRAVENOUS at 08:04

## 2018-04-25 RX ADMIN — GENTAMICIN SULFATE 348 MG: 40 INJECTION, SOLUTION INTRAMUSCULAR; INTRAVENOUS at 07:04

## 2018-04-25 RX ADMIN — PROPOFOL 120 MG: 10 INJECTION, EMULSION INTRAVENOUS at 07:04

## 2018-04-25 RX ADMIN — ROCURONIUM BROMIDE 10 MG: 10 INJECTION, SOLUTION INTRAVENOUS at 08:04

## 2018-04-25 RX ADMIN — FENTANYL CITRATE 50 MCG: 50 INJECTION, SOLUTION INTRAMUSCULAR; INTRAVENOUS at 08:04

## 2018-04-25 RX ADMIN — SCOPOLAMINE 1 PATCH: 1 PATCH, EXTENDED RELEASE TRANSDERMAL at 06:04

## 2018-04-25 RX ADMIN — CLINDAMYCIN IN 5 PERCENT DEXTROSE 900 MG: 18 INJECTION, SOLUTION INTRAVENOUS at 07:04

## 2018-04-25 RX ADMIN — FENTANYL CITRATE 50 MCG: 50 INJECTION, SOLUTION INTRAMUSCULAR; INTRAVENOUS at 09:04

## 2018-04-25 RX ADMIN — ROBINUL 0.2 MG: 0.2 INJECTION INTRAMUSCULAR; INTRAVENOUS at 07:04

## 2018-04-25 RX ADMIN — KETOROLAC TROMETHAMINE 30 MG: 30 INJECTION, SOLUTION INTRAMUSCULAR; INTRAVENOUS at 10:04

## 2018-04-25 RX ADMIN — EPHEDRINE SULFATE 10 MG: 50 INJECTION, SOLUTION INTRAMUSCULAR; INTRAVENOUS; SUBCUTANEOUS at 07:04

## 2018-04-25 RX ADMIN — ROCURONIUM BROMIDE 50 MG: 10 INJECTION, SOLUTION INTRAVENOUS at 07:04

## 2018-04-25 RX ADMIN — ACETAMINOPHEN 1000 MG: 10 INJECTION, SOLUTION INTRAVENOUS at 09:04

## 2018-04-25 RX ADMIN — DEXAMETHASONE SODIUM PHOSPHATE 4 MG: 4 INJECTION, SOLUTION INTRA-ARTICULAR; INTRALESIONAL; INTRAMUSCULAR; INTRAVENOUS; SOFT TISSUE at 08:04

## 2018-04-25 NOTE — ANESTHESIA PREPROCEDURE EVALUATION
04/25/2018  Aimee Montoya is a 67 y.o., female.    Anesthesia Evaluation    I have reviewed the Patient Summary Reports.    I have reviewed the Nursing Notes.   I have reviewed the Medications.     Review of Systems  Anesthesia Hx:  Hx of Anesthetic complications  Denies Family Hx of Anesthesia complications.  Personal Hx of Anesthesia complications, Post-Operative Nausea/Vomiting   Social:  Non-Smoker    Hematology/Oncology:  Hematology Normal   Oncology Normal     EENT/Dental:EENT/Dental Normal   Cardiovascular:   Hypertension CHF: PONV.    Pulmonary:  Pulmonary Normal    Renal/:  Renal/ Normal     Hepatic/GI:  Hepatic/GI Normal    Musculoskeletal:  Spine Disorders: cervical    Neurological:  Neurology Normal    Endocrine:   Diabetes, type 2        Physical Exam  General:  Well nourished    Airway/Jaw/Neck:  Airway Findings: Mouth Opening: Normal Tongue: Normal  General Airway Assessment: Adult  Mallampati: III  Jaw/Neck Findings:  Neck ROM: Normal Extension, Painful      Dental:  Dental Findings: Upper Dentures, Lower Dentures   Chest/Lungs:  Chest/Lungs Findings: Clear to auscultation, Normal Respiratory Rate     Heart/Vascular:  Heart Findings: Rate: Normal  Rhythm: Regular Rhythm             Anesthesia Plan  Type of Anesthesia, risks & benefits discussed:  Anesthesia Type:  general  Patient's Preference:   Intra-op Monitoring Plan:   Intra-op Monitoring Plan Comments:   Post Op Pain Control Plan:   Post Op Pain Control Plan Comments:   Induction:   IV  Beta Blocker:  Patient is not currently on a Beta-Blocker (No further documentation required).       Informed Consent: Patient understands risks and agrees with Anesthesia plan.  Questions answered. Anesthesia consent signed with patient.  ASA Score: 2     Day of Surgery Review of History & Physical: I have interviewed and examined the patient. I  have reviewed the patient's H&P dated:  There are no significant changes.  H&P update referred to the surgeon.         Ready For Surgery From Anesthesia Perspective.

## 2018-04-25 NOTE — HPI
Patient is a 67 y.o. female  who presents with intermittent post menopausal bleeding despite negtive emb; . Onset of symptoms was several years ago; has had 2 negative emb; history of fibroid uterus; denies use of hrt;  sono:   Uterus: Visualized  Endometrium: distorted by presence of fibroids  Uterus long 6.9 cm  Uterus ap 4.3 cm  Uterus tr 5.2 cm  Uterus vol 80.2 cmÂ³  Endometrial thickness, total 4.1 mm  Fibroids: Fibroids identified  Findings: Intramural  D1 42.1 mm  D2 28.6 mm  D3 33.4 mm  Mean 34.7 mm  Vol 21.057 cmÂ³    Right Ovary  =========  Rt ovary: Suboptimal    Left Ovary  ========  Lt ovary: Visualized  Lt ovary D1 2.6 cm  Lt ovary D2 1.7 cm  Lt ovary D3 1.5 cm  Lt ovary mean 1.9 cm  Lt ovary vol 3.4 cmÂ³    Impression  =========  4.2 x 3.9 x 3.3cm intramural fibroid seen and endometrial stripe somewhat obscured by the fibroid  Right ovary suboptimal  Left ovary and adnexa appear normal   Wishes to proceed with definitive treatment; desires hysterectomy with removal of tubes and ovaries

## 2018-04-25 NOTE — ASSESSMENT & PLAN NOTE
Ready to proceed with robotic assisted hysterectomy with bilateral salpingo ophorectomy  Clindamycin/gent preop due to pcn allergy  Cardiac clearance in media

## 2018-04-25 NOTE — BRIEF OP NOTE
[facility] -   Brief Operative Note     SUMMARY     Surgery Date: 4/25/2018     Surgeon(s) and Role:     * Jessica Iraheta MD - Primary     * Christian Serrano MD - Assisting        Pre-op Diagnosis:  Intramural leiomyoma of uterus [D25.1]  Post-menopausal bleeding [N95.0]    Post-op Diagnosis:  Post-Op Diagnosis Codes:     * Intramural leiomyoma of uterus [D25.1]     * Post-menopausal bleeding [N95.0]    Procedure(s) (LRB):  XI ROBOTIC ASSISTED LAPAROSCOPIC HYSTERECTOMY (N/A)  XI ROBOT ASSISTED LAPAROSCOPIC SALPINGO-OOPHERECTOMY (Bilateral)  XI ROBOTIC ASSISTED LAPAROSCOPIC LYSIS OF ADHESIONS (N/A)    Anesthesia: General    Description of the findings of the procedure: see op note for details    Findings/Key Components: 10 wk size uterus, pedunculated fibroids posteriorly, normal ovaries; omental adhesions upper abdominal wall and to posterior uterus    Estimated Blood Loss: 10 mL         Specimens:   Specimen (12h ago through future)    Start     Ordered    04/25/18 0922  Specimen to Pathology - Surgery  Once     Comments:  1. Uterus, cervix, bilateral fallopian tubes and ovaries Dx: Intramural leiomyoma of uterus, Post-menopausal bleeding      04/25/18 0921          Discharge Note    SUMMARY     Admit Date: 4/25/2018    Discharge Date and Time:  04/25/2018 10:10 AM    Hospital Course (synopsis of major diagnoses, care, treatment, and services provided during the course of the hospital stay): Pt underwent robotic assisted laparoscopic hysterectomy with bilateral salpingoophorectomy with lysis of adhesions for postmenopausal bleeding.  She tolerated the procedure well and was stable for discharge from the pacu.       Final Diagnosis: Post-Op Diagnosis Codes:     * Intramural leiomyoma of uterus [D25.1]     * Post-menopausal bleeding [N95.0]    Disposition: Home or Self Care    Follow Up/Patient Instructions:   Keep scheduled 2 wk post op appt with dr iraheta in [place]  Pelvic rest x 6 wks; no lifting  over 15lbs  Medications:  Reconciled Home Medications:      Medication List      START taking these medications    hydrocodone-acetaminophen 5-325mg 5-325 mg per tablet  Commonly known as:  NORCO  Take 1 tablet by mouth every 4 (four) hours as needed for Pain.     ibuprofen 800 MG tablet  Commonly known as:  ADVIL,MOTRIN  Take 1 tablet (800 mg total) by mouth every 8 (eight) hours as needed for Pain.        CONTINUE taking these medications    cyclobenzaprine 10 MG tablet  Commonly known as:  FLEXERIL  Take 10 mg by mouth 3 (three) times daily as needed for Muscle spasms.     loratadine 10 mg tablet  Commonly known as:  CLARITIN  Take 10 mg by mouth once daily.     metFORMIN 500 MG tablet  Commonly known as:  GLUCOPHAGE  Take 500 mg by mouth daily with breakfast.     metoprolol succinate 25 MG 24 hr tablet  Commonly known as:  TOPROL-XL  Take 25 mg by mouth every evening.     omeprazole 40 MG capsule  Commonly known as:  PRILOSEC  Take 40 mg by mouth once daily.     PATADAY 0.2 % Drop  Generic drug:  olopatadine  1 drop once daily.     rosuvastatin 10 MG tablet  Commonly known as:  CRESTOR  Take 10 mg by mouth every evening.     valsartan-hydrochlorothiazide 320-12.5 mg per tablet  Commonly known as:  DIOVAN-HCT        STOP taking these medications    oxyCODONE 5 mg Cap  Commonly known as:  OXY-IR            Discharge Procedure Orders  Diet general     Other restrictions (specify):   Scheduling Instructions: Pelvic rest x 6 weeks (no tampons, intercourse douching); showers only for 6 wks; no lifting more than 15lbs     Call MD for:  temperature >100.4     Call MD for:  persistent nausea and vomiting     Call MD for:  severe uncontrolled pain     Call MD for:  difficulty breathing, headache or visual disturbances     Call MD for:   Scheduling Instructions: Vaginal bleeding >1 pad/hr x 2 hrs     No dressing needed       Follow-up Information     Jessica Esposito MD In 2 weeks.    Specialty:  Obstetrics and  Gynecology  Why:  has post op appt  Contact information:  [address] [place] [address] [phone]

## 2018-04-25 NOTE — DISCHARGE INSTRUCTIONS
General Information:    1. Do not drink alcoholic beverages including beer for 24 hours or as long as you are on pain medication..  2. Do not drive a motor vehicle, operate machinery or power tools, or signs legal papers for 24 hours or as long as you are on pain medication.   3. You may experience light-headedness, dizziness, and sleepiness following surgery. Please do not stay alone. A responsible adult should be with you for this 24 hour period.  4. Go home and rest.  5. Progress slowly to a normal diet unless instructed.  Otherwise, begin with liquids such as soft drinks, then soup and crackers working up to solid foods. Drink plenty of nonalcoholic fluids.  6. Certain anesthetics and pain medications produce nausea and vomiting in certain individuals. If nausea becomes a problem at home, call you doctor.  7. A nurse will be calling you sometime after surgery. Do not be alarmed. This is our way of finding out how you are doing.  8. Several times every hour while you are awake, take 2-3 deep breaths and cough. If you had stomach surgery hold a pillow or rolled towel firmly against your stomach before you cough. This will help with any pain the cough might cause.  9. Several times every hour while you are awake, pump and flex your feet 5-6 times and do foot circles. This will help prevent blood clots.  10. Call your doctor for severe pain, bleeding, fever, or signs or symptoms of infection (pain, swelling, redness, foul odor, drainage).  11. You can contact your doctor anytime by [phone] for the [facility] (at [facility]) or [phone] for the [facility] on [facility].   [email or web address] is another way to contact your doctor if you are an active participant online with [facility].  12. Continue Nozin provided at discharge twice daily for 7 days or until the incision is healed.  See pamphlet or box for instructions.

## 2018-04-25 NOTE — OP NOTE
[facility]  Surgery Department  Operative Note    SUMMARY     Date of Procedure: 4/25/2018     Procedure: Procedure(s) (LRB):  XI ROBOTIC ASSISTED LAPAROSCOPIC HYSTERECTOMY (N/A)  XI ROBOT ASSISTED LAPAROSCOPIC SALPINGO-OOPHERECTOMY (Bilateral)  XI ROBOTIC ASSISTED LAPAROSCOPIC LYSIS OF ADHESIONS (N/A)     Surgeon(s) and Role:     * Jessica Esposito MD - Primary     * Christian Serrano MD - Assisting        Pre-Operative Diagnosis: Intramural leiomyoma of uterus [D25.1]  Post-menopausal bleeding [N95.0]    Post-Operative Diagnosis: Post-Op Diagnosis Codes:     * Intramural leiomyoma of uterus [D25.1]     * Post-menopausal bleeding [N95.0]    Anesthesia: General    Technical Procedures Used: Robotic-assisted lysis of adhesion    Description of the Findings of the Procedure:     The patient was undergoing a robotic hysterectomy by the gynecology service.  She was found to have adhesions To a pedunculated Fibroid and the right ovary.  The general surgery was asked to separate the adhesions between the fibroid ovary and omentum.    This was done using the robotic curved dissector and the robotic hook cautery.  The uterus was elevated.  The omentum was retracted and Omentum was retracted.  The robotic scissors wereused to separate the omentum fusing electrocautery    The omentum was then  from using the robotic scissors.  A second pedunculated fibroid was  from adhesions to the omentum using robotic scissors.  Finely adhesions of the colon to the uterus were  using robotic scissors.   Electrocautery was used sparingly      Significant Surgical Tasks Conducted by the Assistant(s), if Applicable: none    Complications: No    Estimated Blood Loss (EBL): 10 mL           Implants: * No implants in log *    Specimens:   Specimen (12h ago through future)    Start     Ordered    04/25/18 0922  Specimen to Pathology - Surgery  Once     Comments:  1. Uterus, cervix, bilateral fallopian  tubes and ovaries Dx: Intramural leiomyoma of uterus, Post-menopausal bleeding      04/25/18 0921                  Condition: Stable    Disposition: PACU - hemodynamically stable.    Attestation: I performed the lysis of adhesions

## 2018-04-25 NOTE — TELEPHONE ENCOUNTER
----- Message from Carie Garcia sent at 4/25/2018 10:18 AM CDT -----  Contact: Recovery Nurse/Nikky  Would like to schedule 2 wk post-op appt following procedure on 04/25.  Please call pt back at [phone].  Md Nichole

## 2018-04-25 NOTE — PLAN OF CARE
Pt resting on stretcher stating pain is moderate and tolerable. Neurovascular checks intact. VSS. Will cont to monitor. See flow sheet for detailed assessment.

## 2018-04-25 NOTE — TELEPHONE ENCOUNTER
Attempted to call recovery nurse to let her know that I will be sending this to Dr. Esposito because it looks like her schedule is full to add a 2 week post op appointment. Please Advise.

## 2018-04-25 NOTE — TRANSFER OF CARE
"Anesthesia Transfer of Care Note    Patient: Aimee Montoya    Procedure(s) Performed: Procedure(s) (LRB):  XI ROBOTIC ASSISTED LAPAROSCOPIC HYSTERECTOMY (N/A)  XI ROBOT ASSISTED LAPAROSCOPIC SALPINGO-OOPHERECTOMY (Bilateral)  XI ROBOTIC ASSISTED LAPAROSCOPIC LYSIS OF ADHESIONS (N/A)    Patient location: PACU    Anesthesia Type: general    Transport from OR: Transported from OR on room air with adequate spontaneous ventilation    Post pain: adequate analgesia    Post assessment: no apparent anesthetic complications    Post vital signs: stable    Level of consciousness: awake    Nausea/Vomiting: no nausea/vomiting    Complications: none    Transfer of care protocol was followed      Last vitals:   Visit Vitals  BP (!) 182/81 (BP Location: Left arm, Patient Position: Sitting)   Temp 36.6 °C (97.9 °F) (Skin)   Resp 20   Ht 5' 7" (1.702 m)   Wt 87 kg (191 lb 12.8 oz)   SpO2 97%   Breastfeeding? No   BMI 30.04 kg/m²     "

## 2018-04-25 NOTE — H&P
[facility] -   Obstetrics & Gynecology  History & Physical    Patient Name: Aimee Montoya  MRN: [id number]  Admission Date: 2018  Primary Care Provider: Chris James MD    Subjective:     Chief Complaint/Reason for Admission: post menopausal bleeding    History of Present Illness:  Patient is a 67 y.o. female   who presents with intermittent post menopausal bleeding despite negtive emb; . Onset of symptoms was several years ago; has had 2 negative emb; history of fibroid uterus; denies use of hrt;  sono:   Uterus: Visualized  Endometrium: distorted by presence of fibroids  Uterus long 6.9 cm  Uterus ap 4.3 cm  Uterus tr 5.2 cm  Uterus vol 80.2 cmÂ³  Endometrial thickness, total 4.1 mm  Fibroids: Fibroids identified  Findings: Intramural  D1 42.1 mm  D2 28.6 mm  D3 33.4 mm  Mean 34.7 mm  Vol 21.057 cmÂ³    Right Ovary  =========  Rt ovary: Suboptimal    Left Ovary  ========  Lt ovary: Visualized  Lt ovary D1 2.6 cm  Lt ovary D2 1.7 cm  Lt ovary D3 1.5 cm  Lt ovary mean 1.9 cm  Lt ovary vol 3.4 cmÂ³    Impression  =========  4.2 x 3.9 x 3.3cm intramural fibroid seen and endometrial stripe somewhat obscured by the fibroid  Right ovary suboptimal  Left ovary and adnexa appear normal   Wishes to proceed with definitive treatment; desires hysterectomy with removal of tubes and ovaries        Obstetric History       T0      L1     SAB0   TAB0   Ectopic0   Multiple0   Live Births0       # Outcome Date GA Lbr Milo/2nd Weight Sex Delivery Anes PTL Lv   1 Para                 Past Medical History:   Diagnosis Date    Diabetes mellitus     High cholesterol     Hypertension     PONV (postoperative nausea and vomiting)     Seasonal allergies      Past Surgical History:   Procedure Laterality Date    BREAST LUMPECTOMY Left      SECTION      x 1    CHOLECYSTECTOMY      TUBAL LIGATION         PTA Medications   Medication Sig    loratadine (CLARITIN) 10 mg tablet Take 10 mg  by mouth once daily.    metformin (GLUCOPHAGE) 500 MG tablet Take 500 mg by mouth daily with breakfast.     metoprolol succinate (TOPROL-XL) 25 MG 24 hr tablet Take 25 mg by mouth every evening.     omeprazole (PRILOSEC) 40 MG capsule Take 40 mg by mouth once daily.    PATADAY 0.2 % Drop 1 drop once daily.     rosuvastatin (CRESTOR) 10 MG tablet Take 10 mg by mouth every evening.     valsartan-hydrochlorothiazide (DIOVAN-HCT) 320-12.5 mg per tablet     cyclobenzaprine (FLEXERIL) 10 MG tablet Take 10 mg by mouth 3 (three) times daily as needed for Muscle spasms.    oxycodone (OXY-IR) 5 mg Cap Take 5 mg by mouth every 4 (four) hours as needed for Pain.       Review of patient's allergies indicates:   Allergen Reactions    Dye Rash    Pcn [penicillins] Rash        Family History     Problem Relation (Age of Onset)    Cancer Father    Heart disease Mother        Social History Main Topics    Smoking status: Never Smoker    Smokeless tobacco: Never Used    Alcohol use No    Drug use: No    Sexual activity: Yes     Partners: Male     Birth control/ protection: Post-menopausal     Review of Systems   Genitourinary: Positive for postmenopausal bleeding.      Objective:     Vital Signs (Most Recent):    Vital Signs (24h Range):        Weight: 81.6 kg (180 lb)  Body mass index is 28.19 kg/m².    No LMP recorded. Patient is postmenopausal.    Physical Exam:   Constitutional: She appears well-developed.     Eyes: Conjunctivae and EOM are normal. Pupils are equal, round, and reactive to light.    Neck: Normal range of motion. Neck supple.    Cardiovascular: Normal rate and regular rhythm.     Pulmonary/Chest: Effort normal.        Abdominal: Soft.             Musculoskeletal: Normal range of motion.       Neurological: She is alert.    Skin: Skin is warm.    Psychiatric: She has a normal mood and affect.       Laboratory:  CBC:   Recent Labs  Lab 04/24/18  1320   WBC 4.08   RBC 4.38   HGB 13.1   HCT 40.4       MCV 92   MCH 29.9   MCHC 32.4     CMP:   Recent Labs  Lab 04/24/18  1320   GLU 97   CALCIUM 10.2   ALBUMIN 4.1   PROT 8.2      K 3.5   CO2 27      BUN 13   CREATININE 0.8   ALKPHOS 44*   ALT 24   AST 31   BILITOT 0.8     I have personallly reviewed all pertinent lab results from the last 24 hours.    Diagnostic Results:  US: Reviewed  see results above    Assessment/Plan:     * Post-menopausal bleeding    Ready to proceed with robotic assisted hysterectomy with bilateral salpingo ophorectomy  Clindamycin/gent preop due to pcn allergy  Cardiac clearance in media            Jessica Esposito MD  Obstetrics & Gynecology  [facility]

## 2018-04-25 NOTE — SUBJECTIVE & OBJECTIVE
Obstetric History       T0      L1     SAB0   TAB0   Ectopic0   Multiple0   Live Births0       # Outcome Date GA Lbr Milo/2nd Weight Sex Delivery Anes PTL Lv   1 Para                 Past Medical History:   Diagnosis Date    Diabetes mellitus     High cholesterol     Hypertension     PONV (postoperative nausea and vomiting)     Seasonal allergies      Past Surgical History:   Procedure Laterality Date    BREAST LUMPECTOMY Left      SECTION      x 1    CHOLECYSTECTOMY      TUBAL LIGATION         PTA Medications   Medication Sig    loratadine (CLARITIN) 10 mg tablet Take 10 mg by mouth once daily.    metformin (GLUCOPHAGE) 500 MG tablet Take 500 mg by mouth daily with breakfast.     metoprolol succinate (TOPROL-XL) 25 MG 24 hr tablet Take 25 mg by mouth every evening.     omeprazole (PRILOSEC) 40 MG capsule Take 40 mg by mouth once daily.    PATADAY 0.2 % Drop 1 drop once daily.     rosuvastatin (CRESTOR) 10 MG tablet Take 10 mg by mouth every evening.     valsartan-hydrochlorothiazide (DIOVAN-HCT) 320-12.5 mg per tablet     cyclobenzaprine (FLEXERIL) 10 MG tablet Take 10 mg by mouth 3 (three) times daily as needed for Muscle spasms.    oxycodone (OXY-IR) 5 mg Cap Take 5 mg by mouth every 4 (four) hours as needed for Pain.       Review of patient's allergies indicates:   Allergen Reactions    Dye Rash    Pcn [penicillins] Rash        Family History     Problem Relation (Age of Onset)    Cancer Father    Heart disease Mother        Social History Main Topics    Smoking status: Never Smoker    Smokeless tobacco: Never Used    Alcohol use No    Drug use: No    Sexual activity: Yes     Partners: Male     Birth control/ protection: Post-menopausal     Review of Systems   Genitourinary: Positive for postmenopausal bleeding.      Objective:     Vital Signs (Most Recent):    Vital Signs (24h Range):        Weight: 81.6 kg (180 lb)  Body mass index is 28.19 kg/m².    No LMP  recorded. Patient is postmenopausal.    Physical Exam:   Constitutional: She appears well-developed.     Eyes: Conjunctivae and EOM are normal. Pupils are equal, round, and reactive to light.    Neck: Normal range of motion. Neck supple.    Cardiovascular: Normal rate and regular rhythm.     Pulmonary/Chest: Effort normal.        Abdominal: Soft.             Musculoskeletal: Normal range of motion.       Neurological: She is alert.    Skin: Skin is warm.    Psychiatric: She has a normal mood and affect.       Laboratory:  CBC:   Recent Labs  Lab 04/24/18  1320   WBC 4.08   RBC 4.38   HGB 13.1   HCT 40.4      MCV 92   MCH 29.9   MCHC 32.4     CMP:   Recent Labs  Lab 04/24/18  1320   GLU 97   CALCIUM 10.2   ALBUMIN 4.1   PROT 8.2      K 3.5   CO2 27      BUN 13   CREATININE 0.8   ALKPHOS 44*   ALT 24   AST 31   BILITOT 0.8     I have personallly reviewed all pertinent lab results from the last 24 hours.    Diagnostic Results:  US: Reviewed  see results above

## 2018-04-26 NOTE — ANESTHESIA POSTPROCEDURE EVALUATION
"Anesthesia Post Evaluation    Patient: Aimee Montoya    Procedure(s) Performed: Procedure(s) (LRB):  XI ROBOTIC ASSISTED LAPAROSCOPIC HYSTERECTOMY (N/A)  XI ROBOT ASSISTED LAPAROSCOPIC SALPINGO-OOPHERECTOMY (Bilateral)  XI ROBOTIC ASSISTED LAPAROSCOPIC LYSIS OF ADHESIONS (N/A)    Final Anesthesia Type: general  Patient location during evaluation: PACU  Patient participation: Yes- Able to Participate  Level of consciousness: awake and alert  Post-procedure vital signs: reviewed and stable  Pain management: adequate  Airway patency: patent  PONV status at discharge: No PONV  Anesthetic complications: no      Cardiovascular status: blood pressure returned to baseline  Respiratory status: unassisted and spontaneous ventilation  Hydration status: euvolemic  Follow-up not needed.        Visit Vitals  BP (!) 147/80   Pulse 72   Temp 36.7 °C (98.1 °F) (Temporal)   Resp 18   Ht 5' 7" (1.702 m)   Wt 87 kg (191 lb 12.8 oz)   SpO2 96%   Breastfeeding? No   BMI 30.04 kg/m²       Pain/Griselda Score: Pain Assessment Performed: Yes (4/25/2018  3:22 PM)  Presence of Pain: complains of pain/discomfort (4/25/2018  3:22 PM)  Pain Rating Prior to Med Admin: 9 (4/25/2018 10:05 AM)  Griselda Score: 9 (4/25/2018  3:22 PM)      "

## 2018-05-01 VITALS
HEART RATE: 72 BPM | OXYGEN SATURATION: 96 % | WEIGHT: 191.81 LBS | DIASTOLIC BLOOD PRESSURE: 80 MMHG | RESPIRATION RATE: 18 BRPM | TEMPERATURE: 98 F | BODY MASS INDEX: 30.1 KG/M2 | HEIGHT: 67 IN | SYSTOLIC BLOOD PRESSURE: 147 MMHG

## 2018-05-09 ENCOUNTER — OFFICE VISIT (OUTPATIENT)
Dept: OBSTETRICS AND GYNECOLOGY | Facility: CLINIC | Age: 68
End: 2018-05-09
Payer: MEDICARE

## 2018-05-09 VITALS
SYSTOLIC BLOOD PRESSURE: 136 MMHG | HEIGHT: 67 IN | WEIGHT: 191.38 LBS | BODY MASS INDEX: 30.04 KG/M2 | DIASTOLIC BLOOD PRESSURE: 70 MMHG

## 2018-05-09 DIAGNOSIS — Z90.710 S/P LAPAROSCOPIC HYSTERECTOMY: Primary | ICD-10-CM

## 2018-05-09 PROCEDURE — 99024 POSTOP FOLLOW-UP VISIT: CPT | Mod: POP,,, | Performed by: OBSTETRICS & GYNECOLOGY

## 2018-05-09 PROCEDURE — 99213 OFFICE O/P EST LOW 20 MIN: CPT | Mod: PBBFAC,PN | Performed by: OBSTETRICS & GYNECOLOGY

## 2018-05-09 PROCEDURE — 99999 PR PBB SHADOW E&M-EST. PATIENT-LVL III: CPT | Mod: PBBFAC,,, | Performed by: OBSTETRICS & GYNECOLOGY

## 2018-05-09 NOTE — PROGRESS NOTES
Subjective:       Patient ID: Aimee Montoya is a 67 y.o. female.    Chief Complaint:  Post-op Evaluation ([facility] 18)      History of Present Illness  HPI  Postoperative Follow-up  Patient presents to the clinic 2 weeks status post Davinci assisted hysterectomy for postmenopausal bleeding. Eating a regular diet without difficulty. Bowel movements are normal. Pain is controlled without any medications.  No prob void  Still reports occasional vaginal bleeding  Denies hot flushes, night sweats  Path report reviewed with patient-benign  GYN & OB History  No LMP recorded. Patient is postmenopausal.   Date of Last Pap: No result found    OB History    Para Term  AB Living   1 1       1   SAB TAB Ectopic Multiple Live Births                  # Outcome Date GA Lbr Milo/2nd Weight Sex Delivery Anes PTL Lv   1 Para                   Review of Systems  Review of Systems   Constitutional: Negative for activity change, appetite change, chills, diaphoresis, fatigue, fever and unexpected weight change.   HENT: Negative for mouth sores and tinnitus.    Eyes: Negative for discharge and visual disturbance.   Respiratory: Negative for cough, shortness of breath and wheezing.    Cardiovascular: Negative for chest pain, palpitations and leg swelling.   Gastrointestinal: Negative for abdominal pain, bloating, blood in stool, constipation, diarrhea, nausea and vomiting.   Endocrine: Negative for diabetes, hair loss, hot flashes, hyperthyroidism and hypothyroidism.   Genitourinary: Negative for decreased libido, dyspareunia, dysuria, flank pain, frequency, genital sores, hematuria, menorrhagia, menstrual problem, pelvic pain, urgency, vaginal bleeding, vaginal discharge, vaginal pain, dysmenorrhea, urinary incontinence, postcoital bleeding, postmenopausal bleeding and vaginal odor.   Musculoskeletal: Negative for back pain and myalgias.   Skin:  Negative for rash, no acne and hair changes.   Neurological: Negative for  seizures, syncope, numbness and headaches.   Hematological: Negative for adenopathy. Does not bruise/bleed easily.   Psychiatric/Behavioral: Negative for depression and sleep disturbance. The patient is not nervous/anxious.    Breast: Negative for breast mass, breast pain, nipple discharge and skin changes          Objective:    Physical Exam:   Constitutional: She appears well-developed.     Eyes: Conjunctivae and EOM are normal. Pupils are equal, round, and reactive to light.    Neck: Normal range of motion. Neck supple.     Pulmonary/Chest: Effort normal. Right breast exhibits no mass, no nipple discharge, no skin change, no tenderness and presence. Left breast exhibits no mass, no nipple discharge, no skin change, no tenderness and presence. Breasts are symmetrical.        Abdominal: Soft. Bowel sounds are normal. She exhibits abdominal incision (umbilical incision slightly open; healing well x 4; no errythema, exudate, induration).     Genitourinary: Pelvic exam was performed with patient supine.           Musculoskeletal: Normal range of motion.       Neurological: She is alert.    Skin: Skin is warm.    Psychiatric: She has a normal mood and affect.          Assessment:     Encounter Diagnosis   Name Primary?    s/p robotic assisted laparoscopic hystrectomy with bilateral salpingoophorectomy, lysis of omental /bowel adhesions Yes             Plan:      Continue pelvic rest  Continue post op care  Continue  to keep incisions clean/dry

## 2018-05-24 ENCOUNTER — OFFICE VISIT (OUTPATIENT)
Dept: OBSTETRICS AND GYNECOLOGY | Facility: CLINIC | Age: 68
End: 2018-05-24
Payer: MEDICARE

## 2018-05-24 VITALS
WEIGHT: 193.81 LBS | DIASTOLIC BLOOD PRESSURE: 84 MMHG | SYSTOLIC BLOOD PRESSURE: 152 MMHG | HEIGHT: 67 IN | BODY MASS INDEX: 30.42 KG/M2

## 2018-05-24 DIAGNOSIS — Z90.710 S/P LAPAROSCOPIC HYSTERECTOMY: Primary | ICD-10-CM

## 2018-05-24 PROBLEM — N95.0 POST-MENOPAUSAL BLEEDING: Status: RESOLVED | Noted: 2018-04-25 | Resolved: 2018-05-24

## 2018-05-24 PROCEDURE — 99024 POSTOP FOLLOW-UP VISIT: CPT | Mod: POP,,, | Performed by: OBSTETRICS & GYNECOLOGY

## 2018-05-24 PROCEDURE — 99213 OFFICE O/P EST LOW 20 MIN: CPT | Mod: PBBFAC,PN | Performed by: OBSTETRICS & GYNECOLOGY

## 2018-05-24 PROCEDURE — 99999 PR PBB SHADOW E&M-EST. PATIENT-LVL III: CPT | Mod: PBBFAC,,, | Performed by: OBSTETRICS & GYNECOLOGY

## 2018-05-24 NOTE — PROGRESS NOTES
"Subjective:       Patient ID: Aimee Montoya is a 67 y.o. female.    Chief Complaint:  Post-op Evaluation      History of Present Illness  HPI  Postoperative Follow-up  Patient presents to the clinic 4 weeks status post Davinci assisted hysterectomy for abnormal uterine bleeding. Eating a regular diet without difficulty. Bowel movements are still bothered by constipation. The patient is not having any pain.  Denies vaginal bleeding;   No intercourse  Reports more hot flushes/night sweats  Denies leak of urine  C/o "cyst" on breast--mammo 2018 with dr yoon  GYN & OB History  No LMP recorded. Patient has had a hysterectomy.   Date of Last Pap: No result found    OB History    Para Term  AB Living   1 1       1   SAB TAB Ectopic Multiple Live Births                  # Outcome Date GA Lbr Milo/2nd Weight Sex Delivery Anes PTL Lv   1 Para                   Review of Systems  Review of Systems   Breast: Positive for skin changes.          Objective:    Physical Exam:   Constitutional: She appears well-developed.     Eyes: Conjunctivae and EOM are normal. Pupils are equal, round, and reactive to light.    Neck: Normal range of motion. Neck supple.     Pulmonary/Chest: Effort normal. Right breast exhibits no mass, no nipple discharge, no skin change, no tenderness and presence. Left breast exhibits no mass, no nipple discharge, no skin change, no tenderness and presence. Breasts are symmetrical.            Abdominal: Soft. Bowel sounds are normal. She exhibits abdominal incision (healed x 4).     Genitourinary: Rectum normal and vagina normal. Pelvic exam was performed with patient supine. Uterus is absent. Vaginal cuff normal.Cervix exhibits absence.           Musculoskeletal: Normal range of motion.       Neurological: She is alert.    Skin: Skin is warm.    Psychiatric: She has a normal mood and affect.          Assessment:     Encounter Diagnosis   Name Primary?    s/p robotic assisted laparoscopic " hystrectomy with bilateral salpingoophorectomy, lysis of omental /bowel adhesions Yes               Plan:   Pelvic rest x 6 more weeks; (cuff not completely healed)  Reassurance given re cyst under skin--keep area clean/dry

## 2018-06-11 ENCOUNTER — TELEPHONE (OUTPATIENT)
Dept: OBSTETRICS AND GYNECOLOGY | Facility: CLINIC | Age: 68
End: 2018-06-11

## 2018-06-11 NOTE — TELEPHONE ENCOUNTER
----- Message from Nikky Rose sent at 6/11/2018  8:30 AM CDT -----  Contact: self [phone] or [phone]  States that she would like to be worked in for an appt with Dr Esposito. States that she is having pain and drainage to one of her incision sites. Please call back at [phone] or [phone]//thank you acc

## 2018-06-13 ENCOUNTER — OFFICE VISIT (OUTPATIENT)
Dept: OBSTETRICS AND GYNECOLOGY | Facility: CLINIC | Age: 68
End: 2018-06-13
Payer: MEDICARE

## 2018-06-13 VITALS
BODY MASS INDEX: 29.35 KG/M2 | DIASTOLIC BLOOD PRESSURE: 84 MMHG | WEIGHT: 187.38 LBS | SYSTOLIC BLOOD PRESSURE: 148 MMHG

## 2018-06-13 DIAGNOSIS — Z90.710 S/P LAPAROSCOPIC HYSTERECTOMY: Primary | ICD-10-CM

## 2018-06-13 PROCEDURE — 99024 POSTOP FOLLOW-UP VISIT: CPT | Mod: POP,,, | Performed by: OBSTETRICS & GYNECOLOGY

## 2018-06-13 PROCEDURE — 99999 PR PBB SHADOW E&M-EST. PATIENT-LVL III: CPT | Mod: PBBFAC,,, | Performed by: OBSTETRICS & GYNECOLOGY

## 2018-06-13 PROCEDURE — 99213 OFFICE O/P EST LOW 20 MIN: CPT | Mod: PBBFAC,PN | Performed by: OBSTETRICS & GYNECOLOGY

## 2018-06-13 RX ORDER — ACETAMINOPHEN 500 MG
500 TABLET ORAL EVERY 6 HOURS PRN
COMMUNITY

## 2018-06-13 NOTE — PROGRESS NOTES
Subjective:       Patient ID: Aimee Montoya is a 67 y.o. female.    Chief Complaint:  Post-op Problem ([facility] 18)      History of Present Illness  HPI  here for incision check   Reports drainage from upper incision  No fever/chills    GYN & OB History  No LMP recorded. Patient has had a hysterectomy.   Date of Last Pap: No result found    OB History    Para Term  AB Living   1 1       1   SAB TAB Ectopic Multiple Live Births                  # Outcome Date GA Lbr Milo/2nd Weight Sex Delivery Anes PTL Lv   1 Para                   Review of Systems  Review of Systems   All other systems reviewed and are negative.          Objective:    Physical Exam:             Abdominal: Soft. She exhibits abdominal incision (superficial skin separation--umbilical incision).                          Assessment:        Encounter Diagnosis   Name Primary?    s/p robotic assisted laparoscopic hystrectomy with bilateral salpingoophorectomy, lysis of omental /bowel adhesions Yes               Plan:      Reassurance given  Continue to keep area clean/dry

## 2018-07-26 ENCOUNTER — OFFICE VISIT (OUTPATIENT)
Dept: OBSTETRICS AND GYNECOLOGY | Facility: CLINIC | Age: 68
End: 2018-07-26
Payer: MEDICARE

## 2018-07-26 VITALS
DIASTOLIC BLOOD PRESSURE: 80 MMHG | WEIGHT: 189.63 LBS | SYSTOLIC BLOOD PRESSURE: 132 MMHG | HEIGHT: 67 IN | BODY MASS INDEX: 29.76 KG/M2

## 2018-07-26 DIAGNOSIS — N95.2 ATROPHIC VAGINITIS: Primary | ICD-10-CM

## 2018-07-26 PROCEDURE — 99999 PR PBB SHADOW E&M-EST. PATIENT-LVL II: CPT | Mod: PBBFAC,,, | Performed by: OBSTETRICS & GYNECOLOGY

## 2018-07-26 PROCEDURE — 99212 OFFICE O/P EST SF 10 MIN: CPT | Mod: PBBFAC,PN | Performed by: OBSTETRICS & GYNECOLOGY

## 2018-07-26 PROCEDURE — 99214 OFFICE O/P EST MOD 30 MIN: CPT | Mod: S$PBB,,, | Performed by: OBSTETRICS & GYNECOLOGY

## 2018-07-26 RX ORDER — ESTRADIOL 0.1 MG/G
1 CREAM VAGINAL
Qty: 42.5 G | Refills: 1 | Status: SHIPPED | OUTPATIENT
Start: 2018-07-26 | End: 2018-10-10 | Stop reason: SDUPTHER

## 2018-07-26 NOTE — PROGRESS NOTES
Subjective:       Patient ID: Aimee Montoya is a 67 y.o. female.    Chief Complaint:  Vaginal Pain      History of Present Illness  HPI  here for problem   C/o interment vaginal pain--external--noticed when sitting  Has not had intercourse  Denies dysuria; urgency or frequency  Denies vaginal bleeding    GYN & OB History  No LMP recorded. Patient has had a hysterectomy.   Date of Last Pap: No result found    OB History    Para Term  AB Living   1 1       1   SAB TAB Ectopic Multiple Live Births                  # Outcome Date GA Lbr Milo/2nd Weight Sex Delivery Anes PTL Lv   1 Para                   Review of Systems  Review of Systems   Genitourinary: Positive for vaginal pain.   All other systems reviewed and are negative.          Objective:    Physical Exam:   Constitutional: She appears well-developed.     Eyes: Conjunctivae and EOM are normal. Pupils are equal, round, and reactive to light.    Neck: Normal range of motion. Neck supple.     Pulmonary/Chest: Effort normal. Right breast exhibits no mass, no nipple discharge, no skin change, no tenderness and presence. Left breast exhibits no mass, no nipple discharge, no skin change, no tenderness and presence. Breasts are symmetrical.        Abdominal: Soft.     Genitourinary: Rectum normal. Pelvic exam was performed with patient supine. Uterus is absent. Right adnexum displays no mass. Left adnexum displays no mass. Vaginal discharge (atrophic) found. Cervix exhibits absence.           Musculoskeletal: Normal range of motion.       Neurological: She is alert.    Skin: Skin is warm.    Psychiatric: She has a normal mood and affect.          Assessment:         Encounter Diagnosis   Name Primary?    Atrophic vaginitis Yes               Plan:      Reassurance given  rx sent for vaginal estrogen

## 2018-10-10 DIAGNOSIS — N95.2 ATROPHIC VAGINITIS: ICD-10-CM

## 2018-10-10 RX ORDER — ESTRADIOL 0.1 MG/G
CREAM VAGINAL
Qty: 42.5 G | Refills: 1 | Status: SHIPPED | OUTPATIENT
Start: 2018-10-10

## 2019-04-03 RX ORDER — LEVOTHYROXINE SODIUM 88 UG/1
88 TABLET ORAL DAILY
COMMUNITY

## 2019-04-03 RX ORDER — LEVOTHYROXINE SODIUM 88 UG/1
88 TABLET ORAL DAILY
Qty: 90 TABLET | Status: CANCELLED | OUTPATIENT
Start: 2019-04-03

## 2020-06-16 ENCOUNTER — OFFICE VISIT (OUTPATIENT)
Dept: OBSTETRICS AND GYNECOLOGY | Facility: CLINIC | Age: 70
End: 2020-06-16
Payer: MEDICARE

## 2020-06-16 VITALS
WEIGHT: 183.75 LBS | BODY MASS INDEX: 28.84 KG/M2 | SYSTOLIC BLOOD PRESSURE: 154 MMHG | DIASTOLIC BLOOD PRESSURE: 84 MMHG | HEIGHT: 67 IN

## 2020-06-16 DIAGNOSIS — M54.9 BACK PAIN, UNSPECIFIED BACK LOCATION, UNSPECIFIED BACK PAIN LATERALITY, UNSPECIFIED CHRONICITY: ICD-10-CM

## 2020-06-16 DIAGNOSIS — R30.0 DYSURIA: Primary | ICD-10-CM

## 2020-06-16 PROCEDURE — 87086 URINE CULTURE/COLONY COUNT: CPT

## 2020-06-16 PROCEDURE — 99999 PR PBB SHADOW E&M-EST. PATIENT-LVL III: ICD-10-PCS | Mod: PBBFAC,,, | Performed by: NURSE PRACTITIONER

## 2020-06-16 PROCEDURE — 99999 PR PBB SHADOW E&M-EST. PATIENT-LVL III: CPT | Mod: PBBFAC,,, | Performed by: NURSE PRACTITIONER

## 2020-06-16 PROCEDURE — 81002 URINALYSIS NONAUTO W/O SCOPE: CPT | Mod: PBBFAC,PN | Performed by: NURSE PRACTITIONER

## 2020-06-16 PROCEDURE — 99213 PR OFFICE/OUTPT VISIT, EST, LEVL III, 20-29 MIN: ICD-10-PCS | Mod: S$PBB,,, | Performed by: NURSE PRACTITIONER

## 2020-06-16 PROCEDURE — 99213 OFFICE O/P EST LOW 20 MIN: CPT | Mod: PBBFAC,PN | Performed by: NURSE PRACTITIONER

## 2020-06-16 PROCEDURE — 99213 OFFICE O/P EST LOW 20 MIN: CPT | Mod: S$PBB,,, | Performed by: NURSE PRACTITIONER

## 2020-06-16 RX ORDER — LOSARTAN POTASSIUM 100 MG/1
TABLET ORAL
COMMUNITY
Start: 2020-06-11

## 2020-06-16 RX ORDER — ATORVASTATIN CALCIUM 10 MG/1
TABLET, FILM COATED ORAL
COMMUNITY
Start: 2020-05-21

## 2020-06-16 RX ORDER — HYDROCHLOROTHIAZIDE 12.5 MG/1
CAPSULE ORAL
COMMUNITY
Start: 2020-06-11

## 2020-06-16 RX ORDER — NITROFURANTOIN (MACROCRYSTALS) 100 MG/1
CAPSULE ORAL
COMMUNITY
Start: 2020-06-01 | End: 2022-09-07

## 2020-06-16 RX ORDER — FLUTICASONE PROPIONATE 50 MCG
SPRAY, SUSPENSION (ML) NASAL
COMMUNITY
Start: 2020-05-11

## 2020-06-16 RX ORDER — OXYCODONE AND ACETAMINOPHEN 5; 325 MG/1; MG/1
TABLET ORAL
COMMUNITY
Start: 2020-05-11

## 2020-06-16 RX ORDER — GABAPENTIN 300 MG/1
CAPSULE ORAL
COMMUNITY
Start: 2020-04-02

## 2020-06-16 RX ORDER — MONTELUKAST SODIUM 10 MG/1
TABLET ORAL
COMMUNITY
Start: 2020-04-11

## 2020-06-16 RX ORDER — ACYCLOVIR 800 MG/1
TABLET ORAL
COMMUNITY
Start: 2020-05-21

## 2020-06-16 RX ORDER — CYCLOBENZAPRINE HCL 10 MG
TABLET ORAL
COMMUNITY
Start: 2020-05-11

## 2020-06-16 NOTE — PROGRESS NOTES
Subjective:       Patient ID: Aimee Montoya is a 69 y.o. female.    Chief Complaint:  Urinary Tract Infection      History of Present Illness  HPI  Dysuria -- burning, lower back since last week with lower abdominal pain  Frequency with small volume  Some blood when wiping on one occurrence last week  No odor noted or vaginal irritation  Urine yellow -- unsure if it looks different or not    GYN & OB History  No LMP recorded. Patient has had a hysterectomy.   Date of Last Pap: No result found    OB History    Para Term  AB Living   1 1       1   SAB TAB Ectopic Multiple Live Births                  # Outcome Date GA Lbr Milo/2nd Weight Sex Delivery Anes PTL Lv   1 Para                Review of Systems  Review of Systems   Gastrointestinal: Negative for abdominal pain.   Genitourinary: Positive for dysuria, frequency and hematuria. Negative for urgency, vaginal discharge, vaginal pain, postmenopausal bleeding, vaginal dryness and vaginal odor.   Musculoskeletal: Positive for back pain.   All other systems reviewed and are negative.          Objective:      Physical Exam:   Constitutional: She is oriented to person, place, and time. She appears well-developed and well-nourished.    HENT:   Head: Normocephalic and atraumatic.    Eyes: Pupils are equal, round, and reactive to light. Conjunctivae and EOM are normal.    Neck: Normal range of motion. Neck supple.    Cardiovascular: Normal rate.     Pulmonary/Chest: Effort normal.        Abdominal: Soft. Hernia confirmed negative in the right inguinal area and confirmed negative in the left inguinal area.     Genitourinary:    Inguinal canal and rectum normal.   Rectum:      No external hemorrhoid.      Pelvic exam was performed with patient supine.   There is no rash, tenderness, lesion or injury on the right labia. There is no rash, tenderness, lesion or injury on the left labia. Cervix is normal. Labial bartholins normal.          Musculoskeletal: Normal  range of motion and moves all extremeties.      Lymphadenopathy: No inguinal adenopathy noted on the right or left side.    Neurological: She is alert and oriented to person, place, and time.    Skin: Skin is warm and dry.    Psychiatric: She has a normal mood and affect. Her behavior is normal. Judgment and thought content normal.           Assessment:        1. Dysuria    2. Back pain, unspecified back location, unspecified back pain laterality, unspecified chronicity       Plan:   Urine dipstick trace of blood; urine culture sent  Pt reports she had MMG this year WNL with PCP; due 2021    Dysuria  -     POCT URINE DIPSTICK WITHOUT MICROSCOPE  -     Urine culture    Back pain, unspecified back location, unspecified back pain laterality, unspecified chronicity  -     POCT URINE DIPSTICK WITHOUT MICROSCOPE  -     Urine culture

## 2020-06-17 LAB
BACTERIA UR CULT: NO GROWTH
BILIRUB SERPL-MCNC: NEGATIVE MG/DL
BLOOD URINE, POC: POSITIVE
CLARITY, POC UA: ABNORMAL
COLOR, POC UA: YELLOW
GLUCOSE UR QL STRIP: NEGATIVE
KETONES UR QL STRIP: NEGATIVE
LEUKOCYTE ESTERASE URINE, POC: NEGATIVE
NITRITE, POC UA: NEGATIVE
PH, POC UA: NEGATIVE
PROTEIN, POC: NEGATIVE
SPECIFIC GRAVITY, POC UA: NEGATIVE
UROBILINOGEN, POC UA: NEGATIVE

## 2020-11-09 ENCOUNTER — OFFICE VISIT (OUTPATIENT)
Dept: OBSTETRICS AND GYNECOLOGY | Facility: CLINIC | Age: 70
End: 2020-11-09
Payer: MEDICARE

## 2020-11-09 VITALS
DIASTOLIC BLOOD PRESSURE: 82 MMHG | HEIGHT: 67 IN | BODY MASS INDEX: 28.72 KG/M2 | WEIGHT: 183 LBS | SYSTOLIC BLOOD PRESSURE: 150 MMHG

## 2020-11-09 DIAGNOSIS — Z12.31 SCREENING MAMMOGRAM, ENCOUNTER FOR: ICD-10-CM

## 2020-11-09 DIAGNOSIS — Z12.4 SCREENING FOR CERVICAL CANCER: ICD-10-CM

## 2020-11-09 DIAGNOSIS — Z01.419 ENCOUNTER FOR GYNECOLOGICAL EXAMINATION (GENERAL) (ROUTINE) WITHOUT ABNORMAL FINDINGS: Primary | ICD-10-CM

## 2020-11-09 PROCEDURE — 99999 PR PBB SHADOW E&M-EST. PATIENT-LVL III: ICD-10-PCS | Mod: PBBFAC,,, | Performed by: OBSTETRICS & GYNECOLOGY

## 2020-11-09 PROCEDURE — G0101 PR CA SCREEN;PELVIC/BREAST EXAM: ICD-10-PCS | Mod: S$PBB,,, | Performed by: OBSTETRICS & GYNECOLOGY

## 2020-11-09 PROCEDURE — G0101 CA SCREEN;PELVIC/BREAST EXAM: HCPCS | Mod: PBBFAC,PN | Performed by: OBSTETRICS & GYNECOLOGY

## 2020-11-09 PROCEDURE — G0101 CA SCREEN;PELVIC/BREAST EXAM: HCPCS | Mod: S$PBB,,, | Performed by: OBSTETRICS & GYNECOLOGY

## 2020-11-09 PROCEDURE — 99999 PR PBB SHADOW E&M-EST. PATIENT-LVL III: CPT | Mod: PBBFAC,,, | Performed by: OBSTETRICS & GYNECOLOGY

## 2020-11-09 PROCEDURE — 99213 OFFICE O/P EST LOW 20 MIN: CPT | Mod: PBBFAC,PN | Performed by: OBSTETRICS & GYNECOLOGY

## 2020-11-09 NOTE — PROGRESS NOTES
Subjective:       Patient ID: Aimee Montoya is a 70 y.o. female.    Chief Complaint:  Annual Exam      History of Present Illness  HPI  Annual Exam-Postmenopausal  Patient presents for annual exam. The patient has no complaints today. The patient is sexually active--denies vaginal dryness, pelvic pain; . GYN screening history: last pap: was normal and patient does not recall when last pap was and last mammogram: approximate date  and was normal. The patient has never been taking hormone replacement therapy. Patient denies post-menopausal vaginal bleeding. The patient wears seatbelts: yes. The patient participates in regular exercise: yes.--stationary bike 15 min; --2x/wk;  Has the patient ever been transfused or tattooed?: no. The patient reports that there is not domestic violence in her life.      Denies hot flushes  No leak of urine    No problems sleeping;     colonsocopy  due      GYN & OB History  No LMP recorded. Patient has had a hysterectomy.   Date of Last Pap: No result found    OB History    Para Term  AB Living   1 1       1   SAB TAB Ectopic Multiple Live Births                  # Outcome Date GA Lbr Milo/2nd Weight Sex Delivery Anes PTL Lv   1 Para                Review of Systems  Review of Systems   All other systems reviewed and are negative.          Objective:      Physical Exam:   Constitutional: She appears well-developed.     Eyes: Pupils are equal, round, and reactive to light. Conjunctivae and EOM are normal.    Neck: Normal range of motion. Neck supple.     Pulmonary/Chest: Effort normal. Right breast exhibits no mass, no nipple discharge, no skin change and no tenderness. Left breast exhibits no mass, no nipple discharge, no skin change and no tenderness. Breasts are symmetrical.        Abdominal: Soft.     Genitourinary:    Vagina, right adnexa, left adnexa and rectum normal.      Pelvic exam was performed with patient supine.   Uterus is absent. There is a  normal right adnexa and a normal left adnexa. Right adnexum displays no mass and no tenderness. Left adnexum displays no mass and no tenderness. No erythema, bleeding, rectocele, cystocele or unspecified prolapse of vaginal walls in the vagina. Labial bartholins normal.Cervix exhibits absence.    Genitourinary Comments: atrophic   negative for vaginal discharge          Musculoskeletal: Normal range of motion.       Neurological: She is alert.    Skin: Skin is warm.    Psychiatric: She has a normal mood and affect.           Assessment:     Encounter Diagnoses   Name Primary?    Encounter for gynecological examination (general) (routine) without abnormal findings Yes    Screening for cervical cancer     Screening mammogram, encounter for                  Plan:      Continue annual well woman exam.  Pap not indicated due to hx of nml pap and hx of lio  mammo ordered, continue yearly until age 74  Osteoporosis prevention; 1200mg calcium/d with source of vitamin d  increase diet, exercise, weight loss

## 2022-06-10 ENCOUNTER — TELEPHONE (OUTPATIENT)
Dept: OBSTETRICS AND GYNECOLOGY | Facility: CLINIC | Age: 72
End: 2022-06-10
Payer: MEDICARE

## 2022-06-10 NOTE — TELEPHONE ENCOUNTER
----- Message from Tim Mota NP sent at 6/10/2022 11:17 AM CDT -----  Last annual 11/2020;  has medicare; annual not due until 11/2022.  If no problem please reschedule.  thanks

## 2022-06-10 NOTE — TELEPHONE ENCOUNTER
Contacted pt in regards to her appointment scheduled on 6/13/22. Advised she's not due until 11/2022. Asked if this was a problem visit-pt denied.    Pt voiced understanding and states she will r/s at that time.

## 2022-09-07 ENCOUNTER — OFFICE VISIT (OUTPATIENT)
Dept: INTERNAL MEDICINE | Facility: CLINIC | Age: 72
End: 2022-09-07
Payer: MEDICARE

## 2022-09-07 VITALS
BODY MASS INDEX: 30.59 KG/M2 | HEART RATE: 71 BPM | WEIGHT: 195.31 LBS | OXYGEN SATURATION: 99 % | DIASTOLIC BLOOD PRESSURE: 78 MMHG | SYSTOLIC BLOOD PRESSURE: 136 MMHG | TEMPERATURE: 98 F | RESPIRATION RATE: 15 BRPM

## 2022-09-07 DIAGNOSIS — R80.9 PROTEINURIA, UNSPECIFIED TYPE: ICD-10-CM

## 2022-09-07 DIAGNOSIS — R39.9 UTI SYMPTOMS: Primary | ICD-10-CM

## 2022-09-07 LAB
BILIRUB SERPL-MCNC: ABNORMAL MG/DL
BLOOD URINE, POC: ABNORMAL
CLARITY, UA: CLEAR
COLOR, POC UA: YELLOW
GLUCOSE UR QL STRIP: ABNORMAL
KETONES UR QL STRIP: ABNORMAL
LEUKOCYTE ESTERASE URINE, POC: ABNORMAL
NITRITE, POC UA: ABNORMAL
PH, POC UA: 6
PROTEIN, POC: 30
SPECIFIC GRAVITY, POC UA: 1
UROBILINOGEN, POC UA: 1

## 2022-09-07 PROCEDURE — 99999 PR PBB SHADOW E&M-EST. PATIENT-LVL V: CPT | Mod: PBBFAC,,, | Performed by: NURSE PRACTITIONER

## 2022-09-07 PROCEDURE — 81001 URINALYSIS AUTO W/SCOPE: CPT | Mod: PBBFAC,PN | Performed by: NURSE PRACTITIONER

## 2022-09-07 PROCEDURE — 99213 PR OFFICE/OUTPT VISIT, EST, LEVL III, 20-29 MIN: ICD-10-PCS | Mod: S$PBB,,, | Performed by: NURSE PRACTITIONER

## 2022-09-07 PROCEDURE — 87086 URINE CULTURE/COLONY COUNT: CPT | Performed by: NURSE PRACTITIONER

## 2022-09-07 PROCEDURE — 99999 PR PBB SHADOW E&M-EST. PATIENT-LVL V: ICD-10-PCS | Mod: PBBFAC,,, | Performed by: NURSE PRACTITIONER

## 2022-09-07 PROCEDURE — 99213 OFFICE O/P EST LOW 20 MIN: CPT | Mod: S$PBB,,, | Performed by: NURSE PRACTITIONER

## 2022-09-07 PROCEDURE — 99215 OFFICE O/P EST HI 40 MIN: CPT | Mod: PBBFAC,PN | Performed by: NURSE PRACTITIONER

## 2022-09-07 RX ORDER — CIPROFLOXACIN 250 MG/1
250 TABLET, FILM COATED ORAL 2 TIMES DAILY
Qty: 10 TABLET | Refills: 0 | Status: SHIPPED | OUTPATIENT
Start: 2022-09-07

## 2022-09-07 RX ORDER — ATENOLOL 25 MG/1
25 TABLET ORAL DAILY
COMMUNITY

## 2022-09-07 RX ORDER — NITROFURANTOIN 25; 75 MG/1; MG/1
100 CAPSULE ORAL 2 TIMES DAILY
Qty: 14 CAPSULE | Refills: 0 | Status: CANCELLED | OUTPATIENT
Start: 2022-09-07

## 2022-09-07 NOTE — PATIENT INSTRUCTIONS
RECOMMEND TO FOLLOW UP WITH PCP IN ONE WEEK-PROTEIN IN URINE.     WILL LET YOU KNOW OF URINE CULTURE REPORT

## 2022-09-07 NOTE — PROGRESS NOTES
CC:Dysuria.  Aimee Montoya is a 72 y.o. female with a new complaint of cloudy urine, dysuria, hematuria, lower abdominal pain, and urinary frequency.   The patient denies urinary incontinence, urinary retention, and urinary urgency.  Symptoms have been present for 1 week(s).   Treatments tried include:increased water intake and started drinking cranberry juice and this has helped the symptoms.    Review of patient's allergies indicates:   Allergen Reactions    Dye Rash    Pcn [penicillins] Rash       Outpatient Medications Prior to Visit   Medication Sig Dispense Refill    atenoloL (TENORMIN) 25 MG tablet Take 25 mg by mouth once daily.      hydroCHLOROthiazide (MICROZIDE) 12.5 mg capsule       losartan (COZAAR) 100 MG tablet       metformin (GLUCOPHAGE) 500 MG tablet Take 500 mg by mouth daily with breakfast.       montelukast (SINGULAIR) 10 mg tablet       omeprazole (PRILOSEC) 40 MG capsule Take 40 mg by mouth once daily.      PATADAY 0.2 % Drop 1 drop once daily.       rosuvastatin (CRESTOR) 10 MG tablet Take 10 mg by mouth every evening.       acetaminophen (TYLENOL) 500 MG tablet Take 500 mg by mouth every 6 (six) hours as needed for Pain.      acyclovir (ZOVIRAX) 800 MG Tab       atorvastatin (LIPITOR) 10 MG tablet       cyclobenzaprine (FLEXERIL) 10 MG tablet TAKE ONE TABLET EVERY EVENING AS NEEDED      estradiol (ESTRACE) 0.01 % (0.1 mg/gram) vaginal cream INSERT 1 GRAM VAGINALLY TWICE WEEKLY (Patient not taking: Reported on 9/7/2022) 42.5 g 1    fluticasone propionate (FLONASE) 50 mcg/actuation nasal spray USE 1 SPRAY IN EACH NOSTRIL ONCE DAILY      gabapentin (NEURONTIN) 300 MG capsule       levothyroxine (SYNTHROID) 88 MCG tablet Take 88 mcg by mouth once daily.      loratadine (CLARITIN) 10 mg tablet Take 10 mg by mouth once daily.      metoprolol succinate (TOPROL-XL) 25 MG 24 hr tablet Take 25 mg by mouth every evening.       oxyCODONE-acetaminophen (PERCOCET) 5-325 mg per tablet        nitrofurantoin (MACRODANTIN) 100 MG capsule        No facility-administered medications prior to visit.        Physical Exam   /78 (BP Location: Left arm, Patient Position: Sitting, BP Method: Medium (Manual))   Pulse 71   Temp 98.1 °F (36.7 °C) (Temporal)   Resp 15   Wt 88.6 kg (195 lb 5.2 oz)   SpO2 99%   BMI 30.59 kg/m²   Constitutional: The patient appears well-developed and well-nourished. No distress.   Cardiovascular: Normal rate, regular rhythm and normal heart sounds.  Exam reveals no gallop and no friction rub.    No murmur heard.  Pulmonary/Chest: Effort normal and breath sounds normal. No respiratory distress. She has no wheezes. She has no rales.   Abdominal: Soft. Bowel sounds are normal. The patient exhibits no distension and no mass. There is tenderness in the suprapubic area. There is no rebound, no guarding and no CVA tenderness. No hernia.   Skin: The patient is not diaphoretic.     The patient had a urinalysis performed today and it was abnormal.     Encounter Diagnoses   Name Primary?    UTI symptoms Yes    Proteinuria, unspecified type        PLAN:  Aimee was seen today for vaginal bleeding.    Diagnoses and all orders for this visit:    UTI symptoms  -     POCT URINE DIPSTICK WITH MICROSCOPE, AUTOMATED  -     Urine culture    Proteinuria, unspecified type    Other orders  -     ciprofloxacin HCl (CIPRO) 250 MG tablet; Take 1 tablet (250 mg total) by mouth 2 (two) times daily.  The following orders have not been finalized:  -     Cancel: nitrofurantoin, macrocrystal-monohydrate, (MACROBID) 100 MG capsule    Medications Ordered This Encounter   Medications    ciprofloxacin HCl (CIPRO) 250 MG tablet     Sig: Take 1 tablet (250 mg total) by mouth 2 (two) times daily.     Dispense:  10 tablet     Refill:  0     [unfilled]  Orders Placed This Encounter   Procedures    Urine culture    POCT URINE DIPSTICK WITH MICROSCOPE, AUTOMATED     RTC if symptoms are worsening or changing  significantly or if not improved by the end of therapy.    RECOMMEND TO FOLLOW UP WITH PCP IN ONE WEEK-PROTEIN IN URINE.     WILL LET YOU KNOW OF URINE CULTURE REPORT

## 2022-09-09 LAB — BACTERIA UR CULT: NO GROWTH

## 2022-10-27 NOTE — OP NOTE
10/27/22                            Nikki Avila  [address] [facility] [place] [address]    To Whom It May Concern:    This is to certify Nikki Avila was evaluated with Donis Sena MD on 10/27/22 and should be excused for any missed work for today.                 Electronically signed by:  Donis Sena MD  Formerly named [facility]  [address] [facility] [address]  Dept Phone: [phone]      [facility] -   General Surgery  Operative Note    SUMMARY     Date of Procedure: 4/25/2018     Procedure: Procedure(s) (LRB):  XI ROBOTIC ASSISTED LAPAROSCOPIC HYSTERECTOMY (N/A)  XI ROBOT ASSISTED LAPAROSCOPIC SALPINGO-OOPHERECTOMY (Bilateral)  XI ROBOTIC ASSISTED LAPAROSCOPIC LYSIS OF ADHESIONS (N/A)       Surgeon(s) and Role:     * Jessica Esposito MD - Primary     * Christian Serrano MD - Assisting  Assistant, Savannah Stevenson      Pre-Operative Diagnosis: Intramural leiomyoma of uterus [D25.1]  Post-menopausal bleeding [N95.0]    Post-Operative Diagnosis: Post-Op Diagnosis Codes:     * Intramural leiomyoma of uterus [D25.1]     * Post-menopausal bleeding [N95.0]  Omental adhesions  Anesthesia: General    Technical Procedures Used: robotic assisted laparoscopic hysterectomy with bilateral salpingo ophorectomy; lysis of adhesions (dictated by Dr Serrano)      The patient was taken to the Operating Room where general        endotracheal anesthesia was induced and found to be adequate.  She was       then placed in the dorsal lithotomy position in the [facility] and her        arms tucked at her sides.  Her perineum was then prepped and draped in a          normal sterile fashion.  A Xiong catheter was placed in her bladder and hung to     gravity.  A ROBBY intrauterine manipulator with a KOH colpotomizer cup and    a vaginal occluder balloon were then placed with a good fit.  All other        instruments were removed from the vagina, and her legs were placed in a      low lithotomy position.  The patient's abdomen was then prepped and draped in    the normal sterile fashion.  Pre-op antibiotics were given.  Time out was performed.    The periumbilical skin was then tented up with perforating towel clamps,     and the Veress needle  was inserted through the umbilicus    into the intraperitoneal cavity.  Intraperitoneal placement was  confirmed with                     a water drop test, and  pneumoperitoneum was achieved with carbon dioxide     gas up to a pressure of 15 mmHg.  The Veress needle was then removed, and an 8mm  skin incision was made 2 cm superior to the   umbilicus.  An 8mm trocar was inserted        through this incision.  The scope was then inserted through this trocar.             Inspection of underlying organs revealed no damage or injury.  Then, 8-mm    trocars were placed bilaterally under direct visualization, and a 5-mm       left  upper quadrant trocar was placed under direct visualization.  The harmonic scapel was used to take down upper abdominal wall omental adhesions.  All            instruments were then removed from the trocars and the patient was placed    in Trendelenburg position.  The patient cart for the da Yany surgical       system was then docked at the bedside.  We then proceeded with  the             hysterectomy.                                                                  Posterior and fundally the uterus was encased with omental adhesions.  Intraop consult was called to Dr Serrano, (see op note for dictation); who carefully dissected the adhesions to free up the uterus.    We then proceeded with the hysterectomy.                                                                    The left round ligament was then cauterized and transected.  This opened     up  the leaf of the broad ligament on the left side, and this incision was       carried anteromedially to create a bladder flap.  The left fallopian tube was then excised working from the fimbriated end to the cornual end along the mesosalpinx. The left uteroovarian ligament was then doubly cauterized and transected.  The left uterine artery was then skeletonized.  We then proceeded to take down the    right round ligament.  The right round ligament was cauterized and transected.  This opened up the broad ligament on the right side and this incision was carried anteromedially.  The bladder flap was  then completed using both cautery and blunt dissection.  The right fallopian tube was then excised working from the fimbriated end to the cornual end along the mesosalpinx.  The right uteroovarian ligament was then doubly cauterized and transected.  The right uterine artery was then skeletonized.  The uterus was then sharply retroflexed, and an anterior colpotomy was made with the monopolar EndoShears along the level of the KOH cup.  The uterus was then sharply anteflexed, and a posterior colpotomy was made with the EndoShears along the level of the KOH cup.      The  colpotomy incision was carried around towards the left side and the left     uterine artery was doubly cauterized and transected.      We then moved [id number] along the right aspect of the KOH    cup.  The right uterine artery was then doubly cauterized and transected.    The colpotomy was then completed working [id number].  The     uterus and cervix were then removed and remained in the vagina to help       maintain pneumoperitoneum for the rest of the case.     The cuff was  irrigated  and cleared of all clots and debris.  Hemostasis at the cuff was achieved with cautery.  The vaginal cuff was then reapproximated with 0-Vicryl figure running sutre tying in the middle with a hemoclip at the end.  Again, the pelvis was irrigated and cleared of all clots and debris.  Excellent hemostasis was noted.      The bilateral ureters were seen in their normal anatomic location and were vermiculating.    At this point, all instruments were removed from the trocars and the patient cart was undocked.  Pneumoperitoneum was then allowed to escape, and all trocars were removed.     Hemostasis at all skin sites was achieved with Bovie cautery.  All skin      sites were closed with 4-0 monocryl in a running subcuticular fashion.  The    patient tolerated the procedure well.  Sponge, lap and needle counts         correct x2.  She will go to  Recovery in stable condition.  Description of the Findings of the Procedure: 10 wk size uterus; calcified pedunculated fibroid, post fundus approx 3-4 cm; additional 1 cm pedunculated fibroid posterior right uterus; normal ovaries/tubes bilaterally    Significant Surgical Tasks Conducted by the Assistant(s), if Applicable: Dr. Serrano, lysis of adhesions    Complications: No    Estimated Blood Loss (EBL): * No values recorded between 4/25/2018  7:32 AM and 4/25/2018  9:22 AM *           Implants: * No implants in log *    Specimens:   Specimen (12h ago through future)    Start     Ordered    04/25/18 0922  Specimen to Pathology - Surgery  Once     Comments:  1. Uterus, cervix, bilateral fallopian tubes and ovaries Dx: Intramural leiomyoma of uterus, Post-menopausal bleeding      04/25/18 0921                  Condition: Good    Disposition: PACU - hemodynamically stable.    Attestation: I performed the procedure.

## 2023-03-13 ENCOUNTER — OFFICE VISIT (OUTPATIENT)
Dept: OBSTETRICS AND GYNECOLOGY | Facility: CLINIC | Age: 73
End: 2023-03-13
Payer: MEDICARE

## 2023-03-13 VITALS
SYSTOLIC BLOOD PRESSURE: 170 MMHG | HEIGHT: 67 IN | WEIGHT: 191.94 LBS | BODY MASS INDEX: 30.12 KG/M2 | DIASTOLIC BLOOD PRESSURE: 76 MMHG

## 2023-03-13 DIAGNOSIS — Z12.31 SCREENING MAMMOGRAM, ENCOUNTER FOR: ICD-10-CM

## 2023-03-13 DIAGNOSIS — Z01.419 ENCOUNTER FOR GYNECOLOGICAL EXAMINATION (GENERAL) (ROUTINE) WITHOUT ABNORMAL FINDINGS: Primary | ICD-10-CM

## 2023-03-13 DIAGNOSIS — Z12.4 SCREENING FOR CERVICAL CANCER: ICD-10-CM

## 2023-03-13 PROCEDURE — 99999 PR PBB SHADOW E&M-EST. PATIENT-LVL III: ICD-10-PCS | Mod: PBBFAC,,, | Performed by: OBSTETRICS & GYNECOLOGY

## 2023-03-13 PROCEDURE — G0101 CA SCREEN;PELVIC/BREAST EXAM: HCPCS | Mod: PBBFAC,PN | Performed by: OBSTETRICS & GYNECOLOGY

## 2023-03-13 PROCEDURE — G0101 PR CA SCREEN;PELVIC/BREAST EXAM: ICD-10-PCS | Mod: S$PBB,,, | Performed by: OBSTETRICS & GYNECOLOGY

## 2023-03-13 PROCEDURE — G0101 CA SCREEN;PELVIC/BREAST EXAM: HCPCS | Mod: S$PBB,,, | Performed by: OBSTETRICS & GYNECOLOGY

## 2023-03-13 PROCEDURE — 99999 PR PBB SHADOW E&M-EST. PATIENT-LVL III: CPT | Mod: PBBFAC,,, | Performed by: OBSTETRICS & GYNECOLOGY

## 2023-03-13 PROCEDURE — 99213 OFFICE O/P EST LOW 20 MIN: CPT | Mod: PBBFAC,PN | Performed by: OBSTETRICS & GYNECOLOGY

## 2023-03-13 NOTE — PROGRESS NOTES
Subjective:       Patient ID: Aimee Montoya is a 72 y.o. female.    Chief Complaint:  Well Woman      History of Present Illness  HPI  Annual Exam-Postmenopausal  Patient presents for annual exam. The patient has no complaints today. The patient is sexually active.occas vaginal dryness;  GYN screening history: last pap: was normal and patient does not recall when last pap was and last mammogram: approximate date  and was normal. The patient has never been taking hormone replacement therapy. Patient denies post-menopausal vaginal bleeding. The patient wears seatbelts: yes. The patient participates in regular exercise: yes. -stationary bike 2 times/wk; Has the patient ever been transfused or tattooed?: no. The patient reports that there is not domestic violence in her life.  No prob sleeping  Denies urinary leakage;   GYN & OB History  No LMP recorded. Patient has had a hysterectomy.   Date of Last Pap: No result found    OB History    Para Term  AB Living   1 1       1   SAB IAB Ectopic Multiple Live Births                  # Outcome Date GA Lbr Milo/2nd Weight Sex Delivery Anes PTL Lv   1 Para                Review of Systems  Review of Systems   Genitourinary:  Positive for vaginal dryness.   All other systems reviewed and are negative.        Objective:      Physical Exam:   Constitutional: She is oriented to person, place, and time. She appears well-developed and well-nourished.     Eyes: Pupils are equal, round, and reactive to light. Conjunctivae and EOM are normal.      Pulmonary/Chest: Effort normal. Right breast exhibits no mass, no nipple discharge, no skin change and no tenderness. Left breast exhibits no mass, no nipple discharge, no skin change and no tenderness. Breasts are symmetrical.        Abdominal: Soft.     Genitourinary:    Vagina, right adnexa, left adnexa and rectum normal.      Pelvic exam was performed with patient supine.   The external female genitalia was normal.    Labial bartholins normal.Right adnexum displays no mass and no tenderness. Left adnexum displays no mass and no tenderness. Vaginal cuff normal.  No erythema,  no vaginal discharge, bleeding, rectocele, cystocele or unspecified prolapse of vaginal walls in the vagina. Vaginal atrophy noted. Cervix is absent.Uterus is absent. Normal urethral meatus.Urethra findings: no urethral massBladder findings: no bladder distention and no bladder tenderness          Musculoskeletal: Normal range of motion and moves all extremeties.       Neurological: She is alert and oriented to person, place, and time.    Skin: Skin is warm.    Psychiatric: She has a normal mood and affect. Her behavior is normal.         Assessment:        Encounter Diagnoses   Name Primary?    Encounter for gynecological examination (general) (routine) without abnormal findings Yes    Screening for cervical cancer     Screening mammogram, encounter for                Plan:      Continue annual well woman exam.  Pap not indicated due to hx of nml pap and hx of lio   mammo ordered, continue yearly until age 74   Osteoporosis prevention; 1200mg calcium/d with source of vitamin d    Continue diet, exercise, weight loss

## 2023-04-29 DIAGNOSIS — Z12.31 SCREENING MAMMOGRAM FOR BREAST CANCER: ICD-10-CM

## 2023-04-29 DIAGNOSIS — N63.42 SUBAREOLAR MASS OF LEFT BREAST: Primary | ICD-10-CM

## 2023-04-29 PROBLEM — Z80.3 FAMILY HISTORY OF BREAST CANCER: Status: ACTIVE | Noted: 2023-04-29

## 2023-04-29 NOTE — PROGRESS NOTES
[facility]  Kwasi Laughlin MD, FACS  Violetta Luis NP-C      Chief Complaint:   Aimee Montoya is a 72 y.o. female presenting today for her annual evaluation.  She is due for mammogram. She reports no interval changes.     History of Present Illness:   Mrs. Montoya first presented on 2011 after an abnormal mammogram demonstrated nodularity. It was not seen well with ultrasound, therefore,  left stereotactic biopsy was recommended and performed on May 17, 2011.  Pathology showed fibrocystic changes with a fragment of a papilloma - open excision was performed and showed only benign findings.   MD:::Bridgett Villanueva MD; Jessica Esposito MD    Past Medical History:   Diagnosis Date    Diabetes mellitus     Family history of breast cancer 2023    High cholesterol     Hypertension     PONV (postoperative nausea and vomiting)     Screening mammogram, encounter for 2023    Seasonal allergies     Subareolar mass of left breast 2023      Past Surgical History:   Procedure Laterality Date    BREAST LUMPECTOMY Left      SECTION      x 1    CHOLECYSTECTOMY      ralh bso  2018    TUBAL LIGATION          Current Outpatient Medications:     acetaminophen (TYLENOL) 500 MG tablet, Take 500 mg by mouth every 6 (six) hours as needed for Pain., Disp: , Rfl:     acyclovir (ZOVIRAX) 800 MG Tab, , Disp: , Rfl:     atenoloL (TENORMIN) 25 MG tablet, Take 25 mg by mouth once daily., Disp: , Rfl:     atorvastatin (LIPITOR) 10 MG tablet, , Disp: , Rfl:     ciprofloxacin HCl (CIPRO) 250 MG tablet, Take 1 tablet (250 mg total) by mouth 2 (two) times daily., Disp: 10 tablet, Rfl: 0    cyclobenzaprine (FLEXERIL) 10 MG tablet, TAKE ONE TABLET EVERY EVENING AS NEEDED, Disp: , Rfl:     estradiol (ESTRACE) 0.01 % (0.1 mg/gram) vaginal cream, INSERT 1 GRAM VAGINALLY TWICE WEEKLY, Disp: 42.5 g, Rfl: 1    fluticasone propionate (FLONASE) 50 mcg/actuation nasal spray, USE 1 SPRAY IN  EACH NOSTRIL ONCE DAILY, Disp: , Rfl:     gabapentin (NEURONTIN) 300 MG capsule, , Disp: , Rfl:     hydroCHLOROthiazide (MICROZIDE) 12.5 mg capsule, , Disp: , Rfl:     levothyroxine (SYNTHROID) 88 MCG tablet, Take 88 mcg by mouth once daily., Disp: , Rfl:     loratadine (CLARITIN) 10 mg tablet, Take 10 mg by mouth once daily., Disp: , Rfl:     losartan (COZAAR) 100 MG tablet, , Disp: , Rfl:     metformin (GLUCOPHAGE) 500 MG tablet, Take 500 mg by mouth daily with breakfast. , Disp: , Rfl:     metoprolol succinate (TOPROL-XL) 25 MG 24 hr tablet, Take 25 mg by mouth every evening. , Disp: , Rfl:     montelukast (SINGULAIR) 10 mg tablet, , Disp: , Rfl:     omeprazole (PRILOSEC) 40 MG capsule, Take 40 mg by mouth once daily., Disp: , Rfl:     oxyCODONE-acetaminophen (PERCOCET) 5-325 mg per tablet, , Disp: , Rfl:     PATADAY 0.2 % Drop, 1 drop once daily. , Disp: , Rfl:     rosuvastatin (CRESTOR) 10 MG tablet, Take 10 mg by mouth every evening. , Disp: , Rfl:    Review of patient's allergies indicates:   Allergen Reactions    Dye Rash    Pcn [penicillins] Rash      Social History     Tobacco Use    Smoking status: Never    Smokeless tobacco: Never   Substance Use Topics    Alcohol use: No      Family History   Problem Relation Age of Onset    Heart disease Mother     Cancer Father     Breast cancer Other 40    Ovarian cancer Other         Review of Systems   Integumentary:  Negative for color change, rash, mole/lesion, breast mass, breast discharge and breast tenderness.   Breast: Negative for mass and tenderness     Physical Exam   HENT:   Head: Normocephalic.   Pulmonary/Chest: Right breast exhibits no inverted nipple, no mass, no nipple discharge, no skin change and no tenderness. Left breast exhibits no inverted nipple, no mass, no nipple discharge, no skin change and no tenderness. No breast swelling.   Genitourinary: No breast swelling.   Musculoskeletal: Lymphadenopathy:      Upper Body:      Right upper body: No  supraclavicular or axillary adenopathy.      Left upper body: No supraclavicular or axillary adenopathy.     Neurological: She is alert.      MAMMOGRAM REPORT: She has some diffuse fibronodular tissue bilaterally; there are no spiculated lesions, distortions or suspicious calcifications noted; NEM    NOTE:::We viewed her films together at today's visit.  We discussed the multiple views obtained and the important findings.  Even benign changes were mentioned and her questions were answered.  She knows that she may receive a formal letter or report from the Radiologist.  She is to contact us if she has questions.    ASSESSMENT and PLAN of CARE    1. Subareolar mass of left breast  Assessment & Plan:  We reviewed our findings today and her questions were answered.  She understands that her imaging and exams have remained stable (and show nothing concerning).  She is comfortable being followed in a conservative fashion.      She understands the importance of monthly self-breast examination and knows to report any and all changes as they occur.        2. Screening mammogram, encounter for  Assessment & Plan:  Her films are stable based on my review.  As this was done as a screening exam, her films will be reviewed by the Radiologist and compared to her previous films.  Her report will usually be received within 24 hours.  Once received and reviewed - I will phone her with any additional recommendations as needed.        3. Family history of breast cancer  Assessment & Plan:  We discussed her family history and how it could impact her own future risks.  We discussed family vs. genetic history and the importance and implications of each.  Genetic Counseling/Testing was offered, and all questions answered to her satisfaction.  She knows that as additional family members are diagnosed - she will need to let us know as this may change follow up and imaging recommendations.        Medical Decision Making:  It is my impression  that this patient suffers all conditions contained in this medical document.  Each of these conditions did affect our plan of care and my medical decision making today.  It is my opinion that the medical decision making concerning this patient was of minimal  difficulty based on the aforementioned conditions.  Any further recommendations will be communicated to the patient by me.  I have reviewed and verified her allergies, list of medications, medical and surgical histories, social history, and a pertinent review of symptoms.     Follow up: 1 year and PRN    For: PE and ZOEY (S) at

## 2023-04-29 NOTE — ASSESSMENT & PLAN NOTE
Her films are stable based on my review.  As this was done as a screening exam, her films will be reviewed by the Radiologist and compared to her previous films.  Her report will usually be received within 24 hours.  Once received and reviewed - I will phone her with any additional recommendations as needed.

## 2023-05-10 ENCOUNTER — OFFICE VISIT (OUTPATIENT)
Dept: SURGERY | Facility: CLINIC | Age: 73
End: 2023-05-10
Payer: MEDICARE

## 2023-05-10 DIAGNOSIS — Z12.31 SCREENING MAMMOGRAM, ENCOUNTER FOR: ICD-10-CM

## 2023-05-10 DIAGNOSIS — Z80.3 FAMILY HISTORY OF BREAST CANCER: ICD-10-CM

## 2023-05-10 DIAGNOSIS — N63.42 SUBAREOLAR MASS OF LEFT BREAST: ICD-10-CM

## 2023-05-10 PROCEDURE — 99213 OFFICE O/P EST LOW 20 MIN: CPT | Mod: S$GLB,,, | Performed by: SPECIALIST

## 2023-05-10 PROCEDURE — 99213 PR OFFICE/OUTPT VISIT, EST, LEVL III, 20-29 MIN: ICD-10-PCS | Mod: S$GLB,,, | Performed by: SPECIALIST

## 2024-04-22 DIAGNOSIS — Z12.31 SCREENING MAMMOGRAM, ENCOUNTER FOR: Primary | ICD-10-CM

## 2024-06-19 ENCOUNTER — OFFICE VISIT (OUTPATIENT)
Dept: SURGERY | Facility: CLINIC | Age: 74
End: 2024-06-19
Payer: MEDICARE

## 2024-06-19 DIAGNOSIS — Z80.3 FAMILY HISTORY OF BREAST CANCER: ICD-10-CM

## 2024-06-19 DIAGNOSIS — N63.42 SUBAREOLAR MASS OF LEFT BREAST: Primary | ICD-10-CM

## 2024-06-19 PROCEDURE — 99999 PR PBB SHADOW E&M-EST. PATIENT-LVL III: CPT | Mod: PBBFAC,,, | Performed by: NURSE PRACTITIONER

## 2024-06-19 PROCEDURE — 99213 OFFICE O/P EST LOW 20 MIN: CPT | Mod: S$PBB,,, | Performed by: NURSE PRACTITIONER

## 2024-06-19 PROCEDURE — 99213 OFFICE O/P EST LOW 20 MIN: CPT | Mod: PBBFAC,PN | Performed by: NURSE PRACTITIONER

## 2024-06-19 NOTE — PROGRESS NOTES
[facility]  Kwasi Laughlin MD, FACS  PAULIE Morales      Date of Service: 2024      Chief Complaint:   Aimee Montoya is a 73 y.o. female presenting today for her annual evaluation.  She is due for a mammogram. She reports no interval changes.     History of Present Illness:   Mrs. Montoya first presented on 2011 after an abnormal mammogram demonstrated nodularity. It was not seen well with ultrasound, therefore, left stereotactic biopsy was recommended and performed on May 17, 2011. Pathology showed fibrocystic changes with a fragment of a papilloma - open excision was performed and showed only benign findings. MD:::Bridgett Villanueva MD; Jessica Esposito MD     Past Medical History:   Diagnosis Date    Diabetes mellitus     Family history of breast cancer 2023    High cholesterol     Hypertension     PONV (postoperative nausea and vomiting)     Screening mammogram, encounter for 2023    Seasonal allergies     Subareolar mass of left breast 2023      Past Surgical History:   Procedure Laterality Date    BREAST LUMPECTOMY Left      SECTION      x 1    CHOLECYSTECTOMY      ralh bso  2018    TUBAL LIGATION          Current Outpatient Medications:     acetaminophen (TYLENOL) 500 MG tablet, Take 500 mg by mouth every 6 (six) hours as needed for Pain., Disp: , Rfl:     acyclovir (ZOVIRAX) 800 MG Tab, , Disp: , Rfl:     atenoloL (TENORMIN) 25 MG tablet, Take 25 mg by mouth once daily., Disp: , Rfl:     atorvastatin (LIPITOR) 10 MG tablet, , Disp: , Rfl:     ciprofloxacin HCl (CIPRO) 250 MG tablet, Take 1 tablet (250 mg total) by mouth 2 (two) times daily., Disp: 10 tablet, Rfl: 0    cyclobenzaprine (FLEXERIL) 10 MG tablet, TAKE ONE TABLET EVERY EVENING AS NEEDED, Disp: , Rfl:     estradiol (ESTRACE) 0.01 % (0.1 mg/gram) vaginal cream, INSERT 1 GRAM VAGINALLY TWICE WEEKLY, Disp: 42.5 g, Rfl: 1    fluticasone propionate (FLONASE) 50 mcg/actuation  nasal spray, USE 1 SPRAY IN EACH NOSTRIL ONCE DAILY, Disp: , Rfl:     gabapentin (NEURONTIN) 300 MG capsule, , Disp: , Rfl:     hydroCHLOROthiazide (MICROZIDE) 12.5 mg capsule, , Disp: , Rfl:     levothyroxine (SYNTHROID) 88 MCG tablet, Take 88 mcg by mouth once daily., Disp: , Rfl:     loratadine (CLARITIN) 10 mg tablet, Take 10 mg by mouth once daily., Disp: , Rfl:     losartan (COZAAR) 100 MG tablet, , Disp: , Rfl:     metformin (GLUCOPHAGE) 500 MG tablet, Take 500 mg by mouth daily with breakfast. , Disp: , Rfl:     metoprolol succinate (TOPROL-XL) 25 MG 24 hr tablet, Take 25 mg by mouth every evening. , Disp: , Rfl:     montelukast (SINGULAIR) 10 mg tablet, , Disp: , Rfl:     omeprazole (PRILOSEC) 40 MG capsule, Take 40 mg by mouth once daily., Disp: , Rfl:     oxyCODONE-acetaminophen (PERCOCET) 5-325 mg per tablet, , Disp: , Rfl:     PATADAY 0.2 % Drop, 1 drop once daily. , Disp: , Rfl:     rosuvastatin (CRESTOR) 10 MG tablet, Take 10 mg by mouth every evening. , Disp: , Rfl:    Review of patient's allergies indicates:   Allergen Reactions    Dye Rash    Pcn [penicillins] Rash      Social History     Tobacco Use    Smoking status: Never    Smokeless tobacco: Never   Substance Use Topics    Alcohol use: No      Family History   Problem Relation Name Age of Onset    Heart disease Mother      Cancer Father      Breast cancer Other Niece 40    Ovarian cancer Other Niece         Review of Systems   Integumentary:  Negative for color change, rash, mole/lesion, breast mass, breast discharge and breast tenderness.   Breast: Negative for mass and tenderness       Physical Exam   HENT:   Head: Normocephalic.   Pulmonary/Chest: Right breast exhibits no inverted nipple, no mass, no nipple discharge, no skin change and no tenderness. Left breast exhibits no inverted nipple, no mass, no nipple discharge, no skin change and no tenderness. No breast swelling.   Genitourinary: No breast swelling.   Musculoskeletal:  Lymphadenopathy:      Upper Body:      Right upper body: No supraclavicular or axillary adenopathy.      Left upper body: No supraclavicular or axillary adenopathy.     Neurological: She is alert.        MAMMOGRAM REPORT: She has some diffuse fibronodular tissue; there are no spiculated lesions, distortions or suspicious calcifications noted; NEM    ASSESSMENT and PLAN OF CARE     1. Subareolar mass of left breast  Assessment & Plan:  We reviewed our findings today and her questions were answered.  She understands that her imaging and exams have remained stable (and show nothing concerning).  She is comfortable being followed in a conservative fashion.      She understands the importance of monthly self-breast examination and knows to report any and all changes as they occur.    NOTE:::We viewed her films together at today's visit.  We discussed the multiple views obtained and the important findings.  Even benign changes were mentioned and her questions were answered.  She is to contact us if she has questions.        2. Family history of breast cancer  Assessment & Plan:  We discussed her family history and how it could impact her own future risks.  We discussed family vs. genetic history and the importance and implications of each.  All questions answered to her satisfaction.  She knows that as additional family members are diagnosed - she will need to let us know as this may change follow up and imaging recommendations.    We had a discussion concerning Breast Cancer Risk Reduction and current NCCN Guidelines. She knows that her risk can be lowered slightly with a healthy lifestyle and minimal ETOH use. Being physically active will also help. She should reduce or stay away from OCPs and HRT as possible.         Medical Decision Making: It is my impression that this patient suffers all conditions contained in this medical document.  Each of these conditions did affect our plan of care and my medical decision  making today.  It is my opinion that the medical decision making concerning this patient was of minimal  difficulty based on the aforementioned conditions.  Any further recommendations will be communicated to the patient by me.  I have reviewed and verified her allergies, list of medications, medical and surgical histories, social history, and a pertinent review of symptoms.     Follow up: 1 year and PRN    For: Physical Examination and MGM (S) at

## 2024-07-08 ENCOUNTER — OFFICE VISIT (OUTPATIENT)
Dept: OBSTETRICS AND GYNECOLOGY | Facility: CLINIC | Age: 74
End: 2024-07-08
Payer: MEDICARE

## 2024-07-08 VITALS
HEIGHT: 67 IN | SYSTOLIC BLOOD PRESSURE: 148 MMHG | BODY MASS INDEX: 25.74 KG/M2 | WEIGHT: 164 LBS | DIASTOLIC BLOOD PRESSURE: 70 MMHG

## 2024-07-08 DIAGNOSIS — Z01.419 ENCOUNTER FOR GYNECOLOGICAL EXAMINATION (GENERAL) (ROUTINE) WITHOUT ABNORMAL FINDINGS: Primary | ICD-10-CM

## 2024-07-08 DIAGNOSIS — Z12.31 SCREENING MAMMOGRAM, ENCOUNTER FOR: ICD-10-CM

## 2024-07-08 DIAGNOSIS — N94.10 DYSPAREUNIA, FEMALE: ICD-10-CM

## 2024-07-08 DIAGNOSIS — N95.2 ATROPHIC VAGINITIS: ICD-10-CM

## 2024-07-08 DIAGNOSIS — Z12.4 SCREENING FOR CERVICAL CANCER: ICD-10-CM

## 2024-07-08 DIAGNOSIS — N95.2 VAGINAL ATROPHY: ICD-10-CM

## 2024-07-08 PROCEDURE — G0101 CA SCREEN;PELVIC/BREAST EXAM: HCPCS | Mod: PBBFAC,PN | Performed by: OBSTETRICS & GYNECOLOGY

## 2024-07-08 PROCEDURE — 99999 PR PBB SHADOW E&M-EST. PATIENT-LVL IV: CPT | Mod: PBBFAC,,, | Performed by: OBSTETRICS & GYNECOLOGY

## 2024-07-08 PROCEDURE — 99214 OFFICE O/P EST MOD 30 MIN: CPT | Mod: PBBFAC,PN | Performed by: OBSTETRICS & GYNECOLOGY

## 2024-07-08 PROCEDURE — G0101 CA SCREEN;PELVIC/BREAST EXAM: HCPCS | Mod: S$PBB,GZ,, | Performed by: OBSTETRICS & GYNECOLOGY

## 2024-07-08 RX ORDER — EMPAGLIFLOZIN 10 MG/1
10 TABLET, FILM COATED ORAL
COMMUNITY

## 2024-07-08 RX ORDER — ESTRADIOL 0.1 MG/G
1 CREAM VAGINAL
Qty: 42.5 G | Refills: 1 | Status: SHIPPED | OUTPATIENT
Start: 2024-07-08 | End: 2025-07-08

## 2024-07-08 NOTE — PROGRESS NOTES
Subjective:       Patient ID: Aimee Montoya is a 73 y.o. female.    Chief Complaint:  Well Woman      History of Present Illness  HPI  Annual Exam-Postmenopausal  Patient presents for annual exam. The patient has no complaints today. The patient is sexually active. --reports occas vaginal dryness--no longer using estrogen vaginal cream; GYN screening history: last pap: was normal and patient does not recall when last pap was and last mammogram: approximate date  and was normal. The patient has never been taking hormone replacement therapy. Patient denies post-menopausal vaginal bleeding. The patient wears seatbelts: yes. The patient participates in regular exercise: yes.--walks 1x/wk;  Has the patient ever been transfused or tattooed?: no. The patient reports that there is not domestic violence in her life.  No problems sleeping  Denies urinry leakage    GYN & OB History  No LMP recorded. Patient has had a hysterectomy.   Date of Last Pap: No result found    OB History    Para Term  AB Living   1 1       1   SAB IAB Ectopic Multiple Live Births                  # Outcome Date GA Lbr Milo/2nd Weight Sex Type Anes PTL Lv   1 Para                Review of Systems  Review of Systems   All other systems reviewed and are negative.          Objective:      Physical Exam:   Constitutional: She is oriented to person, place, and time. She appears well-developed and well-nourished.     Eyes: Pupils are equal, round, and reactive to light. Conjunctivae and EOM are normal.      Pulmonary/Chest: Effort normal. Right breast exhibits no mass, no nipple discharge, no skin change and no tenderness. Left breast exhibits no mass, no nipple discharge, no skin change and no tenderness. Breasts are symmetrical.        Abdominal: Soft.     Genitourinary:    Inguinal canal, urethra, bladder, vagina, right adnexa, left adnexa and rectum normal.      Pelvic exam was performed with patient supine.   The external female  genitalia was normal.     Labial bartholins normal.Right adnexum displays no mass and no tenderness. Left adnexum displays no mass and no tenderness. No erythema, vaginal discharge, bleeding, rectocele, cystocele or prolapse of vaginal walls in the vagina. Vaginal atrophy noted. Cervix is absent.Uterus is absent. Normal urethral meatus.Urethra findings: no urethral massBladder findings: no bladder distention and no bladder tenderness          Musculoskeletal: Normal range of motion and moves all extremeties.       Neurological: She is alert and oriented to person, place, and time.    Skin: Skin is warm.    Psychiatric: She has a normal mood and affect. Her behavior is normal.           Assessment:     Encounter Diagnoses   Name Primary?    Encounter for gynecological examination (general) (routine) without abnormal findings Yes    Screening mammogram, encounter for     Screening for cervical cancer                 Plan:      Continue annual well woman exam.  Pap not indicated due to hx of nml pap and hx of lio  mammo ordered, continue yearly until age 74  Encourage diet, exercise, weight loss  Accepts vaginal estrogen  Osteoporosis prevention; 1200mg calcium/d with source of vitamin d

## 2025-01-20 DIAGNOSIS — N95.2 ATROPHIC VAGINITIS: ICD-10-CM

## 2025-01-21 RX ORDER — ESTRADIOL 0.1 MG/G
CREAM VAGINAL
Qty: 42.5 G | Refills: 1 | Status: SHIPPED | OUTPATIENT
Start: 2025-01-21

## 2025-01-21 NOTE — TELEPHONE ENCOUNTER
Refill Decision Note   Aimee Montoya  is requesting a refill authorization.  Brief Assessment and Rationale for Refill:  Approve     Medication Therapy Plan:         Comments:     Note composed:8:58 AM 01/21/2025

## 2025-05-13 ENCOUNTER — TELEPHONE (OUTPATIENT)
Dept: SURGERY | Facility: CLINIC | Age: 75
End: 2025-05-13
Payer: MEDICARE

## 2025-05-14 ENCOUNTER — TELEPHONE (OUTPATIENT)
Dept: SURGERY | Facility: CLINIC | Age: 75
End: 2025-05-14
Payer: MEDICARE

## 2025-05-14 NOTE — TELEPHONE ENCOUNTER
Left pt VM regarding cancellation of future visit with [facility] due to her relocation and rescheduling with [facility] providers; [facility]

## 2025-07-23 DIAGNOSIS — N95.2 ATROPHIC VAGINITIS: ICD-10-CM

## 2025-07-23 RX ORDER — ESTRADIOL 0.1 MG/G
CREAM VAGINAL
Qty: 42.5 G | Refills: 0 | Status: SHIPPED | OUTPATIENT
Start: 2025-07-23

## 2025-07-23 NOTE — TELEPHONE ENCOUNTER
Refill Decision Note   Aimee Montoya  is requesting a refill authorization.  Brief Assessment and Rationale for Refill:  Approve     Medication Therapy Plan:        Comments:     Note composed:10:22 AM 07/23/2025

## 2025-08-26 ENCOUNTER — TELEPHONE (OUTPATIENT)
Dept: OBSTETRICS AND GYNECOLOGY | Facility: CLINIC | Age: 75
End: 2025-08-26
Payer: MEDICARE

## 2025-08-28 ENCOUNTER — OFFICE VISIT (OUTPATIENT)
Dept: OBSTETRICS AND GYNECOLOGY | Facility: CLINIC | Age: 75
End: 2025-08-28
Payer: MEDICARE

## 2025-08-28 VITALS
DIASTOLIC BLOOD PRESSURE: 78 MMHG | SYSTOLIC BLOOD PRESSURE: 144 MMHG | BODY MASS INDEX: 25.15 KG/M2 | WEIGHT: 160.25 LBS | HEIGHT: 67 IN

## 2025-08-28 DIAGNOSIS — Z12.31 SCREENING MAMMOGRAM, ENCOUNTER FOR: ICD-10-CM

## 2025-08-28 DIAGNOSIS — Z01.419 ENCOUNTER FOR GYNECOLOGICAL EXAMINATION (GENERAL) (ROUTINE) WITHOUT ABNORMAL FINDINGS: ICD-10-CM

## 2025-08-28 DIAGNOSIS — R35.0 URINARY FREQUENCY: ICD-10-CM

## 2025-08-28 DIAGNOSIS — Z12.4 SCREENING FOR CERVICAL CANCER: ICD-10-CM

## 2025-08-28 DIAGNOSIS — N89.8 VAGINAL DISCHARGE: ICD-10-CM

## 2025-08-28 PROCEDURE — 87086 URINE CULTURE/COLONY COUNT: CPT | Performed by: OBSTETRICS & GYNECOLOGY

## 2025-08-28 PROCEDURE — 99999 PR PBB SHADOW E&M-EST. PATIENT-LVL III: CPT | Mod: PBBFAC,,, | Performed by: OBSTETRICS & GYNECOLOGY

## 2025-08-30 LAB — BACTERIA UR CULT: NORMAL

## 2025-09-04 ENCOUNTER — TELEPHONE (OUTPATIENT)
Dept: OBSTETRICS AND GYNECOLOGY | Facility: CLINIC | Age: 75
End: 2025-09-04
Payer: MEDICARE

## (undated) DEVICE — DRAPE ARM DAVINCI XI

## (undated) DEVICE — ELECTRODE REM PLYHSV RETURN 9

## (undated) DEVICE — SYR 50CC LL

## (undated) DEVICE — KIT ANTIFOG

## (undated) DEVICE — SEE MEDLINE ITEM 154981

## (undated) DEVICE — OBTURATOR BLADELESS 8MM XI CLR

## (undated) DEVICE — SEAL UNIVERSAL 5MM-8MM XI

## (undated) DEVICE — TUBING HEATED INSUFFLATOR

## (undated) DEVICE — COVER OVERHEAD SURG LT BLUE

## (undated) DEVICE — DRAPE STERI LONG

## (undated) DEVICE — SUT ABS CLIP LAPRA-TY CTD

## (undated) DEVICE — SUT VICRYL PLUS 0 CT1 36IN

## (undated) DEVICE — SEE MEDLINE ITEM 157117

## (undated) DEVICE — DRAPE COLUMN DAVINCI XI

## (undated) DEVICE — COVER TIP CURVED SCISSORS XI

## (undated) DEVICE — ADHESIVE DERMABOND ADVANCED

## (undated) DEVICE — SHEARS HARMONIC 5CM 36CM

## (undated) DEVICE — SOL 9P NACL IRR PIC IL

## (undated) DEVICE — GLOVE PROTEXIS HYDROGEL SZ6.5

## (undated) DEVICE — OCCLUDER COLPO-PNEUMO STERILE

## (undated) DEVICE — SEE MEDLINE ITEM 157181

## (undated) DEVICE — SEE MEDLINE ITEM 152622

## (undated) DEVICE — NDL PNEUMO INSUFFLATI 120MM

## (undated) DEVICE — Device

## (undated) DEVICE — DRAPE LAVH LAPAROSCOPY W/FLUID

## (undated) DEVICE — SYR 10CC LUER LOCK

## (undated) DEVICE — EVACUATOR KIT SMOKE PLUME AWAY

## (undated) DEVICE — APPLICATOR CHLORAPREP ORN 26ML

## (undated) DEVICE — POSITIONER HEAD DONUT 9IN FOAM

## (undated) DEVICE — SUT CTD VICRYL PLUS 4/0

## (undated) DEVICE — SEE MEDLINE ITEM 146372

## (undated) DEVICE — SUT CTD VICRYL 0 UND BR SUT

## (undated) DEVICE — SEE MEDLINE ITEM 157027

## (undated) DEVICE — TIP RUMI GREEN DISPOSABLE6.7MM

## (undated) DEVICE — SUPPORT ULNA NERVE PROTECTOR

## (undated) DEVICE — SEE MEDLINE ITEM 146292

## (undated) DEVICE — GLOVE 6.5 PROTEXIS PI MICRO

## (undated) DEVICE — GLOVE 7.0 PROTEXIS PI BLUE

## (undated) DEVICE — SOL ELECTROLUBE ANTI-STIC

## (undated) DEVICE — SYR 3CC LUER LOC